# Patient Record
Sex: MALE | Race: WHITE | NOT HISPANIC OR LATINO | Employment: FULL TIME | URBAN - METROPOLITAN AREA
[De-identification: names, ages, dates, MRNs, and addresses within clinical notes are randomized per-mention and may not be internally consistent; named-entity substitution may affect disease eponyms.]

---

## 2020-04-29 ENCOUNTER — COMMUNICATION - HEALTHEAST (OUTPATIENT)
Dept: SCHEDULING | Facility: CLINIC | Age: 50
End: 2020-04-29

## 2021-06-07 NOTE — TELEPHONE ENCOUNTER
RN Triage:    History of diabetes and obesity.  Lives in [place], [place].    Headache, increasing shortness of breath, abdominal pain and fatigue worsening over several days.  Feels more comfortable breathing with CPAP machine.  Primary physician in [place] suggested that he get tested for Covid-19.  Referred to [email or web address].    Ricarda Mireles RN   [facility]    Reason for Disposition    MILD difficulty breathing (e.g., minimal/no SOB at rest, SOB with walking, pulse <100)    HIGH RISK patient (e.g., age > 64 years, diabetes, heart or lung disease, weak immune system)    Protocols used: CORONAVIRUS (COVID-19) DIAGNOSED OR [id number] 3.30.20

## 2022-08-05 LAB — HEP C HIM: NORMAL

## 2023-05-16 LAB
ALBUMIN (URINE) MG/SPEC: 75 MG/L
ALBUMIN/CREATININE RATIO: 71.4 MG/G CREAT
CREATININE (URINE): 1.05 G/L

## 2023-09-14 ENCOUNTER — TRANSFERRED RECORDS (OUTPATIENT)
Dept: MULTI SPECIALTY CLINIC | Facility: CLINIC | Age: 53
End: 2023-09-14

## 2023-09-14 LAB
CHOLESTEROL (EXTERNAL): 161 MG/DL (ref 100–199)
HDLC SERPL-MCNC: 40 MG/DL
LDL CHOLESTEROL CALCULATED (EXTERNAL): 83 MG/DL
NON HDL CHOLESTEROL (EXTERNAL): 121 MG/DL
TRIGLYCERIDES (EXTERNAL): 191 MG/DL

## 2024-02-09 ENCOUNTER — TRANSFERRED RECORDS (OUTPATIENT)
Dept: MULTI SPECIALTY CLINIC | Facility: CLINIC | Age: 54
End: 2024-02-09

## 2024-02-09 LAB — HBA1C MFR BLD: 6.3 %

## 2024-03-13 ENCOUNTER — OFFICE VISIT (OUTPATIENT)
Dept: FAMILY MEDICINE | Facility: CLINIC | Age: 54
End: 2024-03-13
Payer: COMMERCIAL

## 2024-03-13 ENCOUNTER — HOSPITAL ENCOUNTER (OUTPATIENT)
Dept: CT IMAGING | Facility: HOSPITAL | Age: 54
Discharge: HOME | End: 2024-03-13
Attending: FAMILY MEDICINE | Admitting: FAMILY MEDICINE
Payer: COMMERCIAL

## 2024-03-13 VITALS
SYSTOLIC BLOOD PRESSURE: 123 MMHG | RESPIRATION RATE: 20 BRPM | HEART RATE: 75 BPM | OXYGEN SATURATION: 96 % | DIASTOLIC BLOOD PRESSURE: 83 MMHG | TEMPERATURE: 99 F

## 2024-03-13 DIAGNOSIS — R10.84 ABDOMINAL PAIN, GENERALIZED: Primary | ICD-10-CM

## 2024-03-13 DIAGNOSIS — R10.84 ABDOMINAL PAIN, GENERALIZED: ICD-10-CM

## 2024-03-13 LAB
ALBUMIN SERPL BCG-MCNC: 4.4 G/DL (ref 3.5–5.2)
ALBUMIN UR-MCNC: NEGATIVE MG/DL
ALP SERPL-CCNC: 59 U/L (ref 40–150)
ALT SERPL W P-5'-P-CCNC: 20 U/L (ref 0–70)
ANION GAP SERPL CALCULATED.3IONS-SCNC: 13 MMOL/L (ref 7–15)
APPEARANCE UR: CLEAR
AST SERPL W P-5'-P-CCNC: 20 U/L (ref 0–45)
BASOPHILS # BLD AUTO: 0 10E3/UL (ref 0–0.2)
BASOPHILS NFR BLD AUTO: 0 %
BILIRUB SERPL-MCNC: 0.4 MG/DL
BILIRUB UR QL STRIP: NEGATIVE
BUN SERPL-MCNC: 10.5 MG/DL (ref 6–20)
CALCIUM SERPL-MCNC: 9.7 MG/DL (ref 8.6–10)
CHLORIDE SERPL-SCNC: 94 MMOL/L (ref 98–107)
COLOR UR AUTO: YELLOW
CREAT BLD-MCNC: 0.9 MG/DL (ref 0.7–1.3)
CREAT SERPL-MCNC: 0.85 MG/DL (ref 0.67–1.17)
DEPRECATED HCO3 PLAS-SCNC: 24 MMOL/L (ref 22–29)
EGFRCR SERPLBLD CKD-EPI 2021: >60 ML/MIN/1.73M2
EGFRCR SERPLBLD CKD-EPI 2021: >90 ML/MIN/1.73M2
EOSINOPHIL # BLD AUTO: 0.1 10E3/UL (ref 0–0.7)
EOSINOPHIL NFR BLD AUTO: 1 %
ERYTHROCYTE [DISTWIDTH] IN BLOOD BY AUTOMATED COUNT: 12.3 % (ref 10–15)
GLUCOSE SERPL-MCNC: 97 MG/DL (ref 70–99)
GLUCOSE UR STRIP-MCNC: >=1000 MG/DL
HCT VFR BLD AUTO: 46.9 % (ref 40–53)
HGB BLD-MCNC: 16.3 G/DL (ref 13.3–17.7)
HGB UR QL STRIP: NEGATIVE
IMM GRANULOCYTES # BLD: 0 10E3/UL
IMM GRANULOCYTES NFR BLD: 0 %
KETONES UR STRIP-MCNC: 15 MG/DL
LEUKOCYTE ESTERASE UR QL STRIP: NEGATIVE
LYMPHOCYTES # BLD AUTO: 2.4 10E3/UL (ref 0.8–5.3)
LYMPHOCYTES NFR BLD AUTO: 26 %
MCH RBC QN AUTO: 29.3 PG (ref 26.5–33)
MCHC RBC AUTO-ENTMCNC: 34.8 G/DL (ref 31.5–36.5)
MCV RBC AUTO: 84 FL (ref 78–100)
MONOCYTES # BLD AUTO: 0.7 10E3/UL (ref 0–1.3)
MONOCYTES NFR BLD AUTO: 8 %
NEUTROPHILS # BLD AUTO: 6 10E3/UL (ref 1.6–8.3)
NEUTROPHILS NFR BLD AUTO: 65 %
NITRATE UR QL: NEGATIVE
PH UR STRIP: 7 [PH] (ref 5–8)
PLATELET # BLD AUTO: 319 10E3/UL (ref 150–450)
POTASSIUM SERPL-SCNC: 5.3 MMOL/L (ref 3.4–5.3)
PROT SERPL-MCNC: 7.2 G/DL (ref 6.4–8.3)
RBC # BLD AUTO: 5.56 10E6/UL (ref 4.4–5.9)
SODIUM SERPL-SCNC: 131 MMOL/L (ref 135–145)
SP GR UR STRIP: 1.01 (ref 1–1.03)
UROBILINOGEN UR STRIP-ACNC: 0.2 E.U./DL
WBC # BLD AUTO: 9.3 10E3/UL (ref 4–11)

## 2024-03-13 PROCEDURE — 82565 ASSAY OF CREATININE: CPT | Performed by: FAMILY MEDICINE

## 2024-03-13 PROCEDURE — 81003 URINALYSIS AUTO W/O SCOPE: CPT | Performed by: FAMILY MEDICINE

## 2024-03-13 PROCEDURE — 36415 COLL VENOUS BLD VENIPUNCTURE: CPT | Performed by: FAMILY MEDICINE

## 2024-03-13 PROCEDURE — 250N000011 HC RX IP 250 OP 636: Performed by: FAMILY MEDICINE

## 2024-03-13 PROCEDURE — 85025 COMPLETE CBC W/AUTO DIFF WBC: CPT | Performed by: FAMILY MEDICINE

## 2024-03-13 PROCEDURE — 82565 ASSAY OF CREATININE: CPT

## 2024-03-13 PROCEDURE — 74177 CT ABD & PELVIS W/CONTRAST: CPT

## 2024-03-13 PROCEDURE — 99204 OFFICE O/P NEW MOD 45 MIN: CPT | Performed by: FAMILY MEDICINE

## 2024-03-13 RX ORDER — GLIPIZIDE 2.5 MG/1
2.5 TABLET, EXTENDED RELEASE ORAL
COMMUNITY
Start: 2023-09-14

## 2024-03-13 RX ORDER — AMLODIPINE BESYLATE 5 MG/1
1 TABLET ORAL DAILY
COMMUNITY
Start: 2023-05-16

## 2024-03-13 RX ORDER — ASPIRIN 81 MG/1
81 TABLET ORAL
COMMUNITY
Start: 2023-05-16

## 2024-03-13 RX ORDER — LISINOPRIL 20 MG/1
1 TABLET ORAL DAILY
COMMUNITY
Start: 2023-05-16

## 2024-03-13 RX ORDER — EMPAGLIFLOZIN 25 MG/1
TABLET, FILM COATED ORAL
COMMUNITY
Start: 2023-06-30

## 2024-03-13 RX ORDER — EPINEPHRINE 0.3 MG/.3ML
0.3 INJECTION SUBCUTANEOUS
COMMUNITY
Start: 2024-02-09 | End: 2024-06-05

## 2024-03-13 RX ORDER — IOPAMIDOL 755 MG/ML
90 INJECTION, SOLUTION INTRAVASCULAR ONCE
Status: COMPLETED | OUTPATIENT
Start: 2024-03-13 | End: 2024-03-13

## 2024-03-13 RX ORDER — PRAVASTATIN SODIUM 20 MG
1 TABLET ORAL AT BEDTIME
COMMUNITY
Start: 2023-05-16 | End: 2024-08-23 | Stop reason: ALTCHOICE

## 2024-03-13 RX ORDER — DIVALPROEX SODIUM 500 MG/1
2 TABLET, EXTENDED RELEASE ORAL AT BEDTIME
COMMUNITY
Start: 2023-08-22

## 2024-03-13 RX ORDER — METFORMIN HCL 500 MG
1000 TABLET, EXTENDED RELEASE 24 HR ORAL
COMMUNITY
Start: 2023-05-16

## 2024-03-13 RX ORDER — PSYLLIUM HUSK 0.4 G
1000 CAPSULE ORAL
COMMUNITY
Start: 2024-01-29

## 2024-03-13 RX ADMIN — IOPAMIDOL 90 ML: 755 INJECTION, SOLUTION INTRAVENOUS at 12:08

## 2024-03-13 NOTE — PROGRESS NOTES
Assessment:       Abdominal pain, generalized    - CBC with platelets differential  - Comprehensive metabolic panel  - CT Abdomen Pelvis w Contrast  - CBC with platelets differential  - Comprehensive metabolic panel  - UA Macroscopic with reflex to Microscopic and Culture - Clinic Collect         Plan:     Patient with abdominal pain of unclear etiology and prognosis.  CBC unremarkable.  UA unremarkable other than large glucose in his urine.  CT scan of his abdomen and pelvis ordered and no evidence to explain patient's symptoms.  Recommend using MiraLAX daily over the next week to see if this will help reduce regular bowel movements and make a follow-up appointment to establish with primary care as well as follow-up of current abdominal symptoms if they are not improving in 1 week.  Patient is agreeable with this plan.  MEDICATIONS:   Orders Placed This Encounter   Medications    amLODIPine (NORVASC) 5 MG tablet     Sig: Take 1 tablet by mouth daily    aspirin 81 MG EC tablet     Sig: Take 81 mg by mouth    VITAMIN D-1000 MAX ST 25 MCG (1000 UT) tablet     Sig: Take 1,000 Units by mouth    vitamin B-12 (CYANOCOBALAMIN) 500 MCG tablet     Sig: Take 500 mcg by mouth    divalproex sodium extended-release (DEPAKOTE ER) 500 MG 24 hr tablet     Sig: Take 2 tablets by mouth at bedtime    JARDIANCE 25 MG TABS tablet     Sig: TAKE 1 TABLET(25 MG) BY MOUTH EVERY DAY. NOTE DOSE INCREASE    EPINEPHrine (ANY BX GENERIC EQUIV) 0.3 MG/0.3ML injection 2-pack     Sig: Inject 0.3 mg into the muscle    glipiZIDE (GLUCOTROL XL) 2.5 MG 24 hr tablet     Sig: Take 2.5 mg by mouth    lisinopril (ZESTRIL) 20 MG tablet     Sig: Take 1 tablet by mouth daily    metFORMIN (GLUCOPHAGE XR) 500 MG 24 hr tablet     Sig: Take 1,000 mg by mouth    pravastatin (PRAVACHOL) 20 MG tablet     Sig: Take 1 tablet by mouth at bedtime       Subjective:       53 year old male presents for evaluation of a 2-week history of abdominal discomfort.  His pain  initially started in the right upper quadrant but now has since moved more so towards the right lower quadrant and left lower quadrant.  He has been having frequent loose bowel movements but not watery.  The caliber is very small.  He often has been having excruciating abdominal pain sometimes with bowel movements but other times without.  He has been urinating okay.  He has been passing more gas recently.  He has never had a colonoscopy.  His appetite has been somewhat decreased.  He denies any nausea or vomiting.  There has been no blood in his stools or no black or tarry stools.  No belching or heartburn symptoms.  There is been no associated nausea.  He has lost about 20 pounds relatively recently but attributes this to his job being more active.    There is no problem list on file for this patient.      No past medical history on file.    No past surgical history on file.    Current Outpatient Medications   Medication    amLODIPine (NORVASC) 5 MG tablet    aspirin 81 MG EC tablet    divalproex sodium extended-release (DEPAKOTE ER) 500 MG 24 hr tablet    glipiZIDE (GLUCOTROL XL) 2.5 MG 24 hr tablet    JARDIANCE 25 MG TABS tablet    lisinopril (ZESTRIL) 20 MG tablet    metFORMIN (GLUCOPHAGE XR) 500 MG 24 hr tablet    pravastatin (PRAVACHOL) 20 MG tablet    vitamin B-12 (CYANOCOBALAMIN) 500 MCG tablet    VITAMIN D-1000 MAX ST 25 MCG (1000 UT) tablet    EPINEPHrine (ANY BX GENERIC EQUIV) 0.3 MG/0.3ML injection 2-pack     No current facility-administered medications for this visit.       Allergies   Allergen Reactions    Fluoxetine Headache    Semaglutide Other (See Comments)     Belching, abd bloating    Shellfish Allergy      Other Reaction(s): Throat Swelling/Closing       No family history on file.    Social History     Socioeconomic History    Marital status: Single     Spouse name: None    Number of children: None    Years of education: None    Highest education level: None   Tobacco Use    Smoking status:  Every Day     Types: Cigarettes     Passive exposure: Never    Smokeless tobacco: Never   Substance and Sexual Activity    Alcohol use: Not Currently    Drug use: Never         Review of Systems  Pertinent items are noted in HPI.      Objective:                 General Appearance:    /83   Pulse 75   Temp 99  F (37.2  C) (Oral)   Resp 20   SpO2 96%         Alert, pleasant, cooperative, no distress, appears stated age, morbidly obese   Head:    Normocephalic, without obvious abnormality, atraumatic   Eyes:    Conjunctiva/corneas clear   Ears:    Normal TM's without erythema or bulging. Normal external ear canals, both ears   Nose:   Nares normal, septum midline, mucosa normal, no drainage    or sinus tenderness   Throat:   Lips, mucosa, and tongue normal; teeth and gums normal.  No tonsilar hypertrophy or exudate.   Neck:   Supple, symmetrical, trachea midline, no adenopathy    Lungs:     Clear to auscultation bilaterally without wheezes, rales, or rhonchi, respirations unlabored    Heart:    Regular rate and rhythm, S1 and S2 normal, no murmur, rub or gallop                          Abdomen: Soft, tender in the right lower quadrant as well as left lower quadrant.  No rebound or guarding.  No distention.  Abdominal exam limited by body habitus.    Extremities:   Extremities normal, atraumatic, no cyanosis or edema   Skin:   Skin color, texture, turgor normal, no rashes or lesions       This note has been dictated using voice recognition software. Any grammatical or context distortions are unintentional and inherent to the software

## 2024-03-13 NOTE — PATIENT INSTRUCTIONS
Your CT scan was completely normal and so far your blood work is normal as well.    I do think it is important for you to establish care with primary care.  Please follow-up with them if your symptoms are getting worse or not improving.

## 2024-05-12 ENCOUNTER — HEALTH MAINTENANCE LETTER (OUTPATIENT)
Age: 54
End: 2024-05-12

## 2024-06-05 ENCOUNTER — OFFICE VISIT (OUTPATIENT)
Dept: FAMILY MEDICINE | Facility: CLINIC | Age: 54
End: 2024-06-05
Payer: COMMERCIAL

## 2024-06-05 VITALS
WEIGHT: 310.5 LBS | SYSTOLIC BLOOD PRESSURE: 132 MMHG | BODY MASS INDEX: 44.45 KG/M2 | RESPIRATION RATE: 12 BRPM | HEART RATE: 85 BPM | TEMPERATURE: 98.9 F | DIASTOLIC BLOOD PRESSURE: 82 MMHG | OXYGEN SATURATION: 97 % | HEIGHT: 70 IN

## 2024-06-05 DIAGNOSIS — R05.1 ACUTE COUGH: ICD-10-CM

## 2024-06-05 DIAGNOSIS — Z91.013 SHELLFISH ALLERGY: ICD-10-CM

## 2024-06-05 DIAGNOSIS — J01.30 ACUTE NON-RECURRENT SPHENOIDAL SINUSITIS: Primary | ICD-10-CM

## 2024-06-05 DIAGNOSIS — F06.33 BIPOLAR AND RELATED DISORDER DUE TO ANOTHER MEDICAL CONDITION WITH MANIC OR HYPOMANIC-LIKE EPISODES: ICD-10-CM

## 2024-06-05 DIAGNOSIS — Z23 NEED FOR VACCINATION: ICD-10-CM

## 2024-06-05 DIAGNOSIS — F43.10 POSTTRAUMATIC STRESS DISORDER WITH DELAYED EXPRESSION: ICD-10-CM

## 2024-06-05 PROBLEM — G25.1 MEDICATION-INDUCED POSTURAL TREMOR: Status: ACTIVE | Noted: 2022-05-05

## 2024-06-05 PROBLEM — F41.1 GENERALIZED ANXIETY DISORDER: Status: ACTIVE | Noted: 2019-07-30

## 2024-06-05 PROBLEM — G47.33 OSA ON CPAP: Status: ACTIVE | Noted: 2017-04-20

## 2024-06-05 PROBLEM — Z87.891 FORMER CIGARETTE SMOKER: Status: ACTIVE | Noted: 2024-06-05

## 2024-06-05 PROBLEM — R79.89 LOW VITAMIN B12 LEVEL: Status: ACTIVE | Noted: 2019-02-01

## 2024-06-05 PROBLEM — S06.9XAA TRAUMATIC BRAIN INJURY (H): Status: ACTIVE | Noted: 2018-07-11

## 2024-06-05 PROBLEM — D17.21 LIPOMA OF RIGHT SHOULDER: Status: ACTIVE | Noted: 2018-06-01

## 2024-06-05 PROBLEM — F12.90 EPISODIC CANNABIS USE: Status: ACTIVE | Noted: 2020-06-08

## 2024-06-05 PROBLEM — A63.0 CONDYLOMA ACUMINATUM IN MALE: Status: ACTIVE | Noted: 2023-01-01

## 2024-06-05 PROCEDURE — 90472 IMMUNIZATION ADMIN EACH ADD: CPT | Performed by: FAMILY MEDICINE

## 2024-06-05 PROCEDURE — 90677 PCV20 VACCINE IM: CPT | Performed by: FAMILY MEDICINE

## 2024-06-05 PROCEDURE — 96127 BRIEF EMOTIONAL/BEHAV ASSMT: CPT | Performed by: FAMILY MEDICINE

## 2024-06-05 PROCEDURE — 99214 OFFICE O/P EST MOD 30 MIN: CPT | Mod: 25 | Performed by: FAMILY MEDICINE

## 2024-06-05 PROCEDURE — 90471 IMMUNIZATION ADMIN: CPT | Performed by: FAMILY MEDICINE

## 2024-06-05 PROCEDURE — 90715 TDAP VACCINE 7 YRS/> IM: CPT | Performed by: FAMILY MEDICINE

## 2024-06-05 RX ORDER — EPINEPHRINE 0.3 MG/.3ML
0.3 INJECTION SUBCUTANEOUS PRN
Qty: 2 EACH | Refills: 2 | Status: SHIPPED | OUTPATIENT
Start: 2024-06-05

## 2024-06-05 RX ORDER — ZIPRASIDONE HYDROCHLORIDE 40 MG/1
40 CAPSULE ORAL EVERY OTHER DAY
COMMUNITY

## 2024-06-05 RX ORDER — BENZONATATE 100 MG/1
100 CAPSULE ORAL 3 TIMES DAILY PRN
Qty: 30 CAPSULE | Refills: 0 | Status: SHIPPED | OUTPATIENT
Start: 2024-06-05 | End: 2024-07-03

## 2024-06-05 ASSESSMENT — ANXIETY QUESTIONNAIRES
5. BEING SO RESTLESS THAT IT IS HARD TO SIT STILL: NEARLY EVERY DAY
GAD7 TOTAL SCORE: 15
4. TROUBLE RELAXING: MORE THAN HALF THE DAYS
IF YOU CHECKED OFF ANY PROBLEMS ON THIS QUESTIONNAIRE, HOW DIFFICULT HAVE THESE PROBLEMS MADE IT FOR YOU TO DO YOUR WORK, TAKE CARE OF THINGS AT HOME, OR GET ALONG WITH OTHER PEOPLE: VERY DIFFICULT
7. FEELING AFRAID AS IF SOMETHING AWFUL MIGHT HAPPEN: MORE THAN HALF THE DAYS
1. FEELING NERVOUS, ANXIOUS, OR ON EDGE: MORE THAN HALF THE DAYS
6. BECOMING EASILY ANNOYED OR IRRITABLE: NOT AT ALL
GAD7 TOTAL SCORE: 15
3. WORRYING TOO MUCH ABOUT DIFFERENT THINGS: NEARLY EVERY DAY
2. NOT BEING ABLE TO STOP OR CONTROL WORRYING: NEARLY EVERY DAY

## 2024-06-05 ASSESSMENT — ENCOUNTER SYMPTOMS
SWEATS: 1
SORE THROAT: 1
COUGH: 1

## 2024-06-05 ASSESSMENT — LIFESTYLE VARIABLES: SMOKING_STATUS: 1

## 2024-06-05 ASSESSMENT — PATIENT HEALTH QUESTIONNAIRE - PHQ9
10. IF YOU CHECKED OFF ANY PROBLEMS, HOW DIFFICULT HAVE THESE PROBLEMS MADE IT FOR YOU TO DO YOUR WORK, TAKE CARE OF THINGS AT HOME, OR GET ALONG WITH OTHER PEOPLE: VERY DIFFICULT
SUM OF ALL RESPONSES TO PHQ QUESTIONS 1-9: 14
SUM OF ALL RESPONSES TO PHQ QUESTIONS 1-9: 14

## 2024-06-05 NOTE — PROGRESS NOTES
"  Assessment & Plan   Problem List Items Addressed This Visit       Bipolar and related disorder due to another medical condition with manic or hypomanic-like episodes     As per his psychiatrist recommendations recommend moving the Geodon to nightly to not only help with sleep but help with some of the manic episode portions that he is currently expressing.         Relevant Orders    Adult Mental Health  Referral    Posttraumatic stress disorder with delayed expression    Relevant Orders    Adult Mental Health  Referral    Shellfish allergy     reFill of epinephrine pen and how and when to use properly         Relevant Medications    EPINEPHrine (ANY BX GENERIC EQUIV) 0.3 MG/0.3ML injection 2-pack     Other Visit Diagnoses       Acute non-recurrent sphenoidal sinusitis    -  Primary    Treatment as per orders.  Follow-up in 6 to 8 weeks for annual physical and to establish care    Relevant Medications    amoxicillin-clavulanate (AUGMENTIN) 875-125 MG tablet    benzonatate (TESSALON) 100 MG capsule    Need for vaccination        Relevant Orders    Pneumococcal 20 Valent Conjugate (Prevnar 20) (Completed)    TDAP 10-64Y (ADACEL,BOOSTRIX) (Completed)    Acute cough        Symptomatic care discussed as well as use of Tessalon Perles.    Relevant Medications    benzonatate (TESSALON) 100 MG capsule              Nicotine/Tobacco Cessation  He reports that he has been smoking cigarettes. He started smoking about 31 years ago. He has a 15.7 pack-year smoking history. He has been exposed to tobacco smoke. He has never used smokeless tobacco.  Nicotine/Tobacco Cessation Plan  Information offered: Patient not interested at this time      BMI  Estimated body mass index is 44.55 kg/m  as calculated from the following:    Height as of this encounter: 1.778 m (5' 10\").    Weight as of this encounter: 140.8 kg (310 lb 8 oz).   Weight management plan: Discussed healthy diet and exercise guidelines    Depression " Screening Follow Up        6/5/2024     8:52 AM   PHQ   PHQ-9 Total Score 14   Q9: Thoughts of better off dead/self-harm past 2 weeks Not at all       Follow Up Actions Taken  Crisis resource information provided in After Visit Summary  Mental Health Referral placed  Adjusted patient's anti-depressant medication.     Regular exercise  See Patient Instructions      Arnol Fisher is a 53 year old, presenting for the following health issues:  Cough (x2 Days) and Depression (with Anxiety (Chronic))        6/5/2024    12:06 PM   Additional Questions   Roomed by NICHOLAS Le   Accompanied by Self         6/5/2024    12:06 PM   Patient Reported Additional Medications   Patient reports taking the following new medications N/A     Patient presents to clinic with 5 days of sinus pressure, losing voice, and mild cough.  Additionally he recently lost his niece his same age as his daughter and is undergoing a divorce and with his history of TBI and memory lapses with those stresses some of the past memories are coming forward.  Does have a psychiatrist.  Does not know currently have a counselor and is willing to get set up with a counselor.  He only takes his Geodon every other day and with the current rev he has not been increased to daily.  Denies any homicidal or suicidal ideation or thoughts of harming self or others.  Just has an overwhelming sense of doom.    History of Present Illness       Reason for visit:  Heavy chest cough and mental distress    He eats 0-1 servings of fruits and vegetables daily.He consumes 1 sweetened beverage(s) daily.He exercises with enough effort to increase his heart rate 9 or less minutes per day.  He exercises with enough effort to increase his heart rate 3 or less days per week.   He is taking medications regularly.         6/5/2024     8:52 AM   PHQ   PHQ-9 Total Score 14   Q9: Thoughts of better off dead/self-harm past 2 weeks Not at all          6/5/2024    12:00 PM   JOANN-7 SCORE   Total  "Score 15           Objective    /82   Pulse 85   Temp 98.9  F (37.2  C) (Oral)   Resp 12   Ht 1.778 m (5' 10\")   Wt 140.8 kg (310 lb 8 oz)   SpO2 97%   BMI 44.55 kg/m    Body mass index is 44.55 kg/m .  Physical Exam  Vitals and nursing note reviewed.   Constitutional:       General: He is not in acute distress.     Appearance: Normal appearance. He is not ill-appearing.   HENT:      Head: Normocephalic and atraumatic.      Comments: Tender to palpation along the ethmoid sinus region     Right Ear: Tympanic membrane, ear canal and external ear normal.      Left Ear: Tympanic membrane, ear canal and external ear normal.      Nose: Congestion present. No rhinorrhea.      Mouth/Throat:      Mouth: Mucous membranes are moist.      Pharynx: No oropharyngeal exudate or posterior oropharyngeal erythema.   Eyes:      Extraocular Movements: Extraocular movements intact.      Conjunctiva/sclera: Conjunctivae normal.   Cardiovascular:      Rate and Rhythm: Normal rate and regular rhythm.      Pulses: Normal pulses.      Heart sounds: Normal heart sounds.   Pulmonary:      Effort: Pulmonary effort is normal.      Breath sounds: Normal breath sounds. No wheezing, rhonchi or rales.   Musculoskeletal:      Cervical back: Normal range of motion.      Right lower leg: No edema.      Left lower leg: No edema.   Lymphadenopathy:      Cervical: No cervical adenopathy.   Neurological:      Mental Status: He is alert and oriented to person, place, and time.   Psychiatric:         Attention and Perception: Attention normal.         Mood and Affect: Mood normal.         Speech: Speech normal.         Thought Content: Thought content normal.      The longitudinal plan of care for the diagnosis(es)/condition(s) as documented were addressed during this visit. Due to the added complexity in care, I will continue to support Phillip in the subsequent management and with ongoing continuity of care.      Signed Electronically by: " Bernardino Aguilera, DO

## 2024-06-05 NOTE — ASSESSMENT & PLAN NOTE
As per his psychiatrist recommendations recommend moving the Geodon to nightly to not only help with sleep but help with some of the manic episode portions that he is currently expressing.

## 2024-06-05 NOTE — LETTER
June 5, 2024      Phillip Rodriguez  [address] [place] [address]        To Whom It May Concern:    Phillip Rodriguez  was seen on 06/05/24.  Please excuse him until 6/7/2024 due to illness.        Sincerely,        Bernardino Aguilera, DO

## 2024-06-12 ENCOUNTER — MYC MEDICAL ADVICE (OUTPATIENT)
Dept: FAMILY MEDICINE | Facility: CLINIC | Age: 54
End: 2024-06-12
Payer: COMMERCIAL

## 2024-06-17 ENCOUNTER — MYC MEDICAL ADVICE (OUTPATIENT)
Dept: FAMILY MEDICINE | Facility: CLINIC | Age: 54
End: 2024-06-17
Payer: COMMERCIAL

## 2024-06-17 DIAGNOSIS — R05.1 ACUTE COUGH: Primary | ICD-10-CM

## 2024-06-17 RX ORDER — AZITHROMYCIN 250 MG/1
TABLET, FILM COATED ORAL
Qty: 6 TABLET | Refills: 0 | Status: SHIPPED | OUTPATIENT
Start: 2024-06-17 | End: 2024-06-22

## 2024-07-02 SDOH — HEALTH STABILITY: PHYSICAL HEALTH: ON AVERAGE, HOW MANY MINUTES DO YOU ENGAGE IN EXERCISE AT THIS LEVEL?: 30 MIN

## 2024-07-02 SDOH — HEALTH STABILITY: PHYSICAL HEALTH: ON AVERAGE, HOW MANY DAYS PER WEEK DO YOU ENGAGE IN MODERATE TO STRENUOUS EXERCISE (LIKE A BRISK WALK)?: 7 DAYS

## 2024-07-02 ASSESSMENT — SOCIAL DETERMINANTS OF HEALTH (SDOH): HOW OFTEN DO YOU GET TOGETHER WITH FRIENDS OR RELATIVES?: ONCE A WEEK

## 2024-07-03 ENCOUNTER — MYC MEDICAL ADVICE (OUTPATIENT)
Dept: FAMILY MEDICINE | Facility: CLINIC | Age: 54
End: 2024-07-03

## 2024-07-03 ENCOUNTER — OFFICE VISIT (OUTPATIENT)
Dept: FAMILY MEDICINE | Facility: CLINIC | Age: 54
End: 2024-07-03
Payer: COMMERCIAL

## 2024-07-03 VITALS
TEMPERATURE: 99.3 F | WEIGHT: 312.8 LBS | DIASTOLIC BLOOD PRESSURE: 74 MMHG | HEIGHT: 70 IN | SYSTOLIC BLOOD PRESSURE: 123 MMHG | RESPIRATION RATE: 16 BRPM | HEART RATE: 93 BPM | OXYGEN SATURATION: 96 % | BODY MASS INDEX: 44.78 KG/M2

## 2024-07-03 DIAGNOSIS — G47.33 OSA ON CPAP: Primary | ICD-10-CM

## 2024-07-03 DIAGNOSIS — E78.5 HYPERLIPIDEMIA LDL GOAL <70: ICD-10-CM

## 2024-07-03 DIAGNOSIS — Z00.00 ROUTINE GENERAL MEDICAL EXAMINATION AT A HEALTH CARE FACILITY: Primary | ICD-10-CM

## 2024-07-03 DIAGNOSIS — I10 PRIMARY HYPERTENSION: ICD-10-CM

## 2024-07-03 DIAGNOSIS — Z91.013 SHELLFISH ALLERGY: ICD-10-CM

## 2024-07-03 DIAGNOSIS — Z87.891 PERSONAL HISTORY OF TOBACCO USE: ICD-10-CM

## 2024-07-03 DIAGNOSIS — F06.33 BIPOLAR AND RELATED DISORDER DUE TO ANOTHER MEDICAL CONDITION WITH MANIC OR HYPOMANIC-LIKE EPISODES: ICD-10-CM

## 2024-07-03 DIAGNOSIS — F43.10 POSTTRAUMATIC STRESS DISORDER WITH DELAYED EXPRESSION: ICD-10-CM

## 2024-07-03 DIAGNOSIS — E11.9 TYPE 2 DIABETES MELLITUS WITHOUT COMPLICATION, WITHOUT LONG-TERM CURRENT USE OF INSULIN (H): ICD-10-CM

## 2024-07-03 DIAGNOSIS — Z12.5 PROSTATE CANCER SCREENING: ICD-10-CM

## 2024-07-03 DIAGNOSIS — R79.89 LOW VITAMIN B12 LEVEL: ICD-10-CM

## 2024-07-03 DIAGNOSIS — Z11.4 SCREENING FOR HIV (HUMAN IMMUNODEFICIENCY VIRUS): ICD-10-CM

## 2024-07-03 DIAGNOSIS — G47.33 OSA ON CPAP: ICD-10-CM

## 2024-07-03 LAB
ERYTHROCYTE [DISTWIDTH] IN BLOOD BY AUTOMATED COUNT: 13 % (ref 10–15)
HBA1C MFR BLD: 6.2 % (ref 0–5.6)
HCT VFR BLD AUTO: 47.6 % (ref 40–53)
HGB BLD-MCNC: 16.3 G/DL (ref 13.3–17.7)
HOLD SPECIMEN: NORMAL
MCH RBC QN AUTO: 29 PG (ref 26.5–33)
MCHC RBC AUTO-ENTMCNC: 34.2 G/DL (ref 31.5–36.5)
MCV RBC AUTO: 85 FL (ref 78–100)
PLATELET # BLD AUTO: 328 10E3/UL (ref 150–450)
RBC # BLD AUTO: 5.62 10E6/UL (ref 4.4–5.9)
WBC # BLD AUTO: 12.3 10E3/UL (ref 4–11)

## 2024-07-03 PROCEDURE — 87389 HIV-1 AG W/HIV-1&-2 AB AG IA: CPT | Performed by: FAMILY MEDICINE

## 2024-07-03 PROCEDURE — 80048 BASIC METABOLIC PNL TOTAL CA: CPT | Performed by: FAMILY MEDICINE

## 2024-07-03 PROCEDURE — G0296 VISIT TO DETERM LDCT ELIG: HCPCS | Performed by: FAMILY MEDICINE

## 2024-07-03 PROCEDURE — 84460 ALANINE AMINO (ALT) (SGPT): CPT | Performed by: FAMILY MEDICINE

## 2024-07-03 PROCEDURE — 83036 HEMOGLOBIN GLYCOSYLATED A1C: CPT | Performed by: FAMILY MEDICINE

## 2024-07-03 PROCEDURE — 80061 LIPID PANEL: CPT | Performed by: FAMILY MEDICINE

## 2024-07-03 PROCEDURE — 82570 ASSAY OF URINE CREATININE: CPT | Performed by: FAMILY MEDICINE

## 2024-07-03 PROCEDURE — G0103 PSA SCREENING: HCPCS | Performed by: FAMILY MEDICINE

## 2024-07-03 PROCEDURE — 36415 COLL VENOUS BLD VENIPUNCTURE: CPT | Performed by: FAMILY MEDICINE

## 2024-07-03 PROCEDURE — 82043 UR ALBUMIN QUANTITATIVE: CPT | Performed by: FAMILY MEDICINE

## 2024-07-03 PROCEDURE — 99214 OFFICE O/P EST MOD 30 MIN: CPT | Mod: 25 | Performed by: FAMILY MEDICINE

## 2024-07-03 PROCEDURE — 85027 COMPLETE CBC AUTOMATED: CPT | Performed by: FAMILY MEDICINE

## 2024-07-03 PROCEDURE — 99396 PREV VISIT EST AGE 40-64: CPT | Mod: 25 | Performed by: FAMILY MEDICINE

## 2024-07-03 RX ORDER — METHYLDOPA/HYDROCHLOROTHIAZIDE 250MG-15MG
1 TABLET ORAL EVERY OTHER DAY
COMMUNITY

## 2024-07-03 NOTE — PATIENT INSTRUCTIONS
Patient Education   Preventive Care Advice   This is general advice given by our system to help you stay healthy. However, your care team may have specific advice just for you. Please talk to your care team about your preventive care needs.  Nutrition  Eat 5 or more servings of fruits and vegetables each day.  Try wheat bread, brown rice and whole grain pasta (instead of white bread, rice, and pasta).  Get enough calcium and vitamin D. Check the label on foods and aim for 100% of the RDA (recommended daily allowance).  Lifestyle  Exercise at least 150 minutes each week  (30 minutes a day, 5 days a week).  Do muscle strengthening activities 2 days a week. These help control your weight and prevent disease.  No smoking.  Wear sunscreen to prevent skin cancer.  Have a dental exam and cleaning every 6 months.  Yearly exams  See your health care team every year to talk about:  Any changes in your health.  Any medicines your care team has prescribed.  Preventive care, family planning, and ways to prevent chronic diseases.  Shots (vaccines)   HPV shots (up to age 26), if you've never had them before.  Hepatitis B shots (up to age 59), if you've never had them before.  COVID-19 shot: Get this shot when it's due.  Flu shot: Get a flu shot every year.  Tetanus shot: Get a tetanus shot every 10 years.  Pneumococcal, hepatitis A, and RSV shots: Ask your care team if you need these based on your risk.  Shingles shot (for age 50 and up)  General health tests  Diabetes screening:  Starting at age 35, Get screened for diabetes at least every 3 years.  If you are younger than age 35, ask your care team if you should be screened for diabetes.  Cholesterol test: At age 39, start having a cholesterol test every 5 years, or more often if advised.  Bone density scan (DEXA): At age 50, ask your care team if you should have this scan for osteoporosis (brittle bones).  Hepatitis C: Get tested at least once in your life.  STIs (sexually  transmitted infections)  Before age 24: Ask your care team if you should be screened for STIs.  After age 24: Get screened for STIs if you're at risk. You are at risk for STIs (including HIV) if:  You are sexually active with more than one person.  You don't use condoms every time.  You or a partner was diagnosed with a sexually transmitted infection.  If you are at risk for HIV, ask about PrEP medicine to prevent HIV.  Get tested for HIV at least once in your life, whether you are at risk for HIV or not.  Cancer screening tests  Cervical cancer screening: If you have a cervix, begin getting regular cervical cancer screening tests starting at age 21.  Breast cancer scan (mammogram): If you've ever had breasts, begin having regular mammograms starting at age 40. This is a scan to check for breast cancer.  Colon cancer screening: It is important to start screening for colon cancer at age 45.  Have a colonoscopy test every 10 years (or more often if you're at risk) Or, ask your provider about stool tests like a FIT test every year or Cologuard test every 3 years.  To learn more about your testing options, visit:   .  For help making a decision, visit:   https://bit.ly/jv43615.  Prostate cancer screening test: If you have a prostate, ask your care team if a prostate cancer screening test (PSA) at age 55 is right for you.  Lung cancer screening: If you are a current or former smoker ages 50 to 80, ask your care team if ongoing lung cancer screenings are right for you.  For informational purposes only. Not to replace the advice of your health care provider. Copyright   2023 [facility]. All rights reserved. Clinically reviewed by the [facility]. [employer] 731784 - REV 01/24.  Learning About Stress  What is stress?     Stress is your body's response to a hard situation. Your body can have a physical, emotional, or mental response. Stress is a fact of life for most people, and it  affects everyone differently. What causes stress for you may not be stressful for someone else.  A lot of things can cause stress. You may feel stress when you go on a job interview, take a test, or run a race. This kind of short-term stress is normal and even useful. It can help you if you need to work hard or react quickly. For example, stress can help you finish an important job on time.  Long-term stress is caused by ongoing stressful situations or events. Examples of long-term stress include long-term health problems, ongoing problems at work, or conflicts in your family. Long-term stress can harm your health.  How does stress affect your health?  When you are stressed, your body responds as though you are in danger. It makes hormones that speed up your heart, make you breathe faster, and give you a burst of energy. This is called the fight-or-flight stress response. If the stress is over quickly, your body goes back to normal and no harm is done.  But if stress happens too often or lasts too long, it can have bad effects. Long-term stress can make you more likely to get sick, and it can make symptoms of some diseases worse. If you tense up when you are stressed, you may develop neck, shoulder, or low back pain. Stress is linked to high blood pressure and heart disease.  Stress also harms your emotional health. It can make you [place], tense, or depressed. Your relationships may suffer, and you may not do well at work or school.  What can you do to manage stress?  You can try these things to help manage stress:   Do something active. Exercise or activity can help reduce stress. Walking is a great way to get started. Even everyday activities such as housecleaning or yard work can help.  Try yoga or kailey chi. These techniques combine exercise and meditation. You may need some training at first to learn them.  Do something you enjoy. For example, listen to music or go to a movie. Practice your hobby or do volunteer  "work.  Meditate. This can help you relax, because you are not worrying about what happened before or what may happen in the future.  Do guided imagery. Imagine yourself in any setting that helps you feel calm. You can use online videos, books, or a teacher to guide you.  Do breathing exercises. For example:  From a standing position, bend forward from the waist with your knees slightly bent. Let your arms dangle close to the floor.  Breathe in slowly and deeply as you return to a standing position. Roll up slowly and lift your head last.  Hold your breath for just a few seconds in the standing position.  Breathe out slowly and bend forward from the waist.  Let your feelings out. Talk, laugh, cry, and express anger when you need to. Talking with supportive friends or family, a counselor, or a adrian leader about your feelings is a healthy way to relieve stress. Avoid discussing your feelings with people who make you feel worse.  Write. It may help to write about things that are bothering you. This helps you find out how much stress you feel and what is causing it. When you know this, you can find better ways to cope.  What can you do to prevent stress?  You might try some of these things to help prevent stress:  Manage your time. This helps you find time to do the things you want and need to do.  Get enough sleep. Your body recovers from the stresses of the day while you are sleeping.  Get support. Your family, friends, and community can make a difference in how you experience stress.  Limit your news feed. Avoid or limit time on social media or news that may make you feel stressed.  Do something active. Exercise or activity can help reduce stress. Walking is a great way to get started.  Where can you learn more?  Go to https://www.healthwise.net/patiented  Enter N032 in the search box to learn more about \"Learning About Stress.\"  Current as of: October 24, 2023               Content Version: 14.0    5086-5253 " Healthwise, [employer].   Care instructions adapted under license by your healthcare professional. If you have questions about a medical condition or this instruction, always ask your healthcare professional. [employer] disclaims any warranty or liability for your use of this information.      Safer Sex: Care Instructions  Overview  Safer sex is a way to reduce your risk of getting a sexually transmitted infection (STI). It can also help prevent pregnancy.  Several products can help you practice safer sex and reduce your chance of STIs. One of the best is a condom. There are internal and external condoms. You can use a special rubber sheet (dental dam) for protection during oral sex. Disposable gloves can keep your hands from touching blood, semen, or other body fluids that can carry infections.  Remember that birth control methods such as diaphragms, IUDs, foams, and birth control pills do not stop you from getting STIs.  Follow-up care is a key part of your treatment and safety. Be sure to make and go to all appointments, and call your doctor if you are having problems. It's also a good idea to know your test results and keep a list of the medicines you take.  How can you care for yourself at home?  Think about getting vaccinated to help prevent hepatitis A, hepatitis B, and human papillomavirus (HPV). They can be spread through sex.  Use a condom every time you have sex. Use an external condom, which goes on the penis. Or use an internal condom, which goes into the vagina or anus.  Make sure you use the right size external condom. A condom that's too small can break easily. A condom that's too big can slip off during sex.  Use a new condom each time you have sex. Be careful not to poke a hole in the condom when you open the wrapper.  Don't use an internal condom and an external condom at the same time.  Never use petroleum jelly (such as Vaseline), grease, hand lotion, baby oil, or anything with  "oil in it. These products can make holes in the condom.  After intercourse, hold the edge of the condom as you remove it. This will help keep semen from spilling out of the condom.  Do not have sex with anyone who has symptoms of an STI, such as sores on the genitals or mouth.  Do not drink a lot of alcohol or use drugs before sex.  Limit your sex partners. Sex with one partner who has sex only with you can reduce your risk of getting an STI.  Don't share sex toys. But if you do share them, use a condom and clean the sex toys between each use.  Talk to any partners before you have sex. Talk about what you feel comfortable with and whether you have any boundaries with sex. And find out if your partner or partners may be at risk for any STI. Keep in mind that a person may be able to spread an STI even if they do not have symptoms. You and any partners may want to get tested for STIs.  Where can you learn more?  Go to https://www.[employer].net/patiented  Enter B608 in the search box to learn more about \"Safer Sex: Care Instructions.\"  Current as of: November 27, 2023               Content Version: 14.0    8030-1380 [employer].   Care instructions adapted under license by your healthcare professional. If you have questions about a medical condition or this instruction, always ask your healthcare professional. [employer] disclaims any warranty or liability for your use of this information.         Lung Cancer Screening   Frequently Asked Questions  If you are at high-risk for lung cancer, getting screened with low-dose computed tomography (LDCT) every year can help save your life. This handout offers answers to some of the most common questions about lung cancer screening. If you have other questions, please call [phone] ([phone]).     What is it?  Lung cancer screening uses special X-ray technology to create an image of your lung tissue. The exam is quick and easy and takes " less than 10 seconds. We don t give you any medicine or use any needles. You can eat before and after the exam. You don t need to change your clothes as long as the clothing on your chest doesn t contain metal. But, you do need to be able to hold your breath for at least 6 seconds during the exam.    What is the goal of lung cancer screening?  The goal of lung cancer screening is to save lives. Many times, lung cancer is not found until a person starts having physical symptoms. Lung cancer screening can help detect lung cancer in the earliest stages when it may be easier to treat.    Who should be screened for lung cancer?  We suggest lung cancer screening for anyone who is at high-risk for lung cancer. You are in the high-risk group if you:      are between the ages of 55 and 79, and    have smoked at least 1 pack of cigarettes a day for 20 or more years, and    still smoke or have quit within the past 15 years.    However, if you have a new cough or shortness of breath, you should talk to your doctor before being screened.    Why does it matter if I have symptoms?  Certain symptoms can be a sign that you have a condition in your lungs that should be checked and treated by your doctor. These symptoms include fever, chest pain, a new or changing cough, shortness of breath that you have never felt before, coughing up blood or unexplained weight loss. Having any of these symptoms can greatly affect the results of lung cancer screening.       Should all smokers get an LDCT lung cancer screening exam?  It depends. Lung cancer screening is for a very specific group of men and women who have a history of heavy smoking over a long period of time (see  Who should be screened for lung cancer  above).  I am in the high-risk group, but have been diagnosed with cancer in the past. Is LDCT lung cancer screening right for me?  In some cases, you should not have LDCT lung screening, such as when your doctor is already following  your cancer with CT scan studies. Your doctor will help you decide if LDCT lung screening is right for you.  Do I need to have a screening exam every year?  Yes. If you are in the high-risk group described earlier, you should get an LDCT lung cancer screening exam every year until you are 79, or are no longer willing or able to undergo screening and possible procedures to diagnose and treat lung cancer.  How effective is LDCT at preventing death from lung cancer?  Studies have shown that LDCT lung cancer screening can lower the risk of death from lung cancer by 20 percent in people who are at high-risk.  What are the risks?  There are some risks and limitations of LDCT lung cancer screening. We want to make sure you understand the risks and benefits, so please let us know if you have any questions. Your doctor may want to talk with you more about these risks.    Radiation exposure: As with any exam that uses radiation, there is a very small increased risk of cancer. The amount of radiation in LDCT is small--about the same amount a person would get from a mammogram. Your doctor orders the exam when he or she feels the potential benefits outweigh the risks.    False negatives: No test is perfect, including LDCT. It is possible that you may have a medical condition, including lung cancer, that is not found during your exam. This is called a false negative result.    False positives and more testing: LDCT very often finds something in the lung that could be cancer, but in fact is not. This is called a false positive result. False positive tests often cause anxiety. To make sure these findings are not cancer, you may need to have more tests. These tests will be done only if you give us permission. Sometimes patients need a treatment that can have side effects, such as a biopsy. For more information on false positives, see  What can I expect from the results?     Findings not related to lung cancer: Your LDCT exam also  takes pictures of areas of your body next to your lungs. In a very small number of cases, the CT scan will show an abnormal finding in one of these areas, such as your kidneys, adrenal glands, liver or thyroid. This finding may not be serious, but you may need more tests. Your doctor can help you decide what other tests you may need, if any.  What can I expect from the results?  About 1 out of 4 LDCT exams will find something that may need more tests. Most of the time, these findings are lung nodules. Lung nodules are very small collections of tissue in the lung. These nodules are very common, and the vast majority--more than 97 percent--are not cancer (benign). Most are normal lymph nodes or small areas of scarring from past infections.  But, if a small lung nodule is found to be cancer, the cancer can be cured more than 90 percent of the time. To know if the nodule is cancer, we may need to get more images before your next yearly screening exam. If the nodule has suspicious features (for example, it is large, has an odd shape or grows over time), we will refer you to a specialist for further testing.  Will my doctor also get the results?  Yes. Your doctor will get a copy of your results.  Is it okay to keep smoking now that there s a cancer screening exam?  No. Tobacco is one of the strongest cancer-causing agents. It causes not only lung cancer, but other cancers and cardiovascular (heart) diseases as well. The damage caused by smoking builds over time. This means that the longer you smoke, the higher your risk of disease. While it is never too late to quit, the sooner you quit, the better.  Where can I find help to quit smoking?  The best way to prevent lung cancer is to stop smoking. If you have already quit smoking, congratulations and keep it up! For help on quitting smoking, please call [employer] at 3-625-QUIT-NOW (1-940.527.7941) or the [employer] at 1-902.612.3610 to find local resources  near you.  One-on-one health coaching:  If you d prefer to work individually with a health care provider on tobacco cessation, we offer:      Medication Therapy Management:  Our specially trained pharmacists work closely with you and your doctor to help you quit smoking.  Call [phone] or [phone] (toll free).

## 2024-07-03 NOTE — PROGRESS NOTES
Preventive Care Visit  [facility]  Jul 3, 2024      Assessment & Plan   Problem List Items Addressed This Visit       Bipolar and related disorder due to another medical condition with manic or hypomanic-like episodes     Doing much better at this time.  Continue current treatment plan         Diabetes mellitus, type II (H)     Doing very well on current regimen and A1c at 6.2 which below goal of 7.0.  Continue current treatment plan.         Relevant Orders    Adult Eye  Referral    HEMOGLOBIN A1C (Completed)    Albumin Random Urine Quantitative with Creat Ratio    Lipid panel reflex to direct LDL Fasting    Basic metabolic panel    ALT    HTN (hypertension)     Below goal of 140/90 on current regimen which does include an ACE inhibitor as well as Jardiance.  Continue current plan.         Low vitamin B12 level     Recheck CBC and order B12 as needed based on results         Relevant Orders    CBC with platelets (Completed)    DARRIN on CPAP     Has AutoPap at home with correct settings from the sleep study that was previously completed and in his medical records.  Does need new supplies and those were ordered today.         Relevant Orders    CPAP/Comprehensive Sleep Order (Completed)    Posttraumatic stress disorder with delayed expression     Doing well with current medication regimen and counseling.  Encouraged to continue.         Shellfish allergy     Has EpiPen and has been trained on proper use.         Personal history of tobacco use     Still current smoker with greater than 20 pack years and over the age of 49.  Has not yet had baseline screening.  Will start screening.  Cessation encouraged.         Relevant Orders    Prof fee: Shared Decision Making for Lung Cancer Screening (Completed)    CT Chest Lung Cancer Scrn Low Dose wo    Hyperlipidemia LDL goal <70     Has been on pravastatin 20 and LDL has been holding around 80.  With  "recent finding on CT scan of the abdomen showing atherosclerotic disease in the aorta, will change goal to less than 70.  Will recheck lipids today and if not at goal then we will look at changing over to either rosuvastatin or atorvastatin at 10 or 40 mg respectively          Other Visit Diagnoses       Routine general medical examination at a health care facility    -  Primary    Screening for HIV (human immunodeficiency virus)        As per USPSTF guidelines    Relevant Orders    HIV Antigen Antibody Combo    Prostate cancer screening        Relevant Orders    Prostate Specific Antigen Screen             Patient has been advised of split billing requirements and indicates understanding: Yes       Counseling  Appropriate preventive services were discussed with this patient, including applicable screening as appropriate for fall prevention, nutrition, physical activity, Tobacco-use cessation, weight loss and cognition.  Checklist reviewing preventive services available has been given to the patient.  Reviewed patient's diet, addressing concerns and/or questions.   The patient was instructed to see the dentist every 6 months.     Regular exercise  See Patient Instructions      Arnol Fisher is a 53 year old, presenting for the following:  Physical (Establish Care:  Left Foot Pain, Bottom x \"Years\")        7/3/2024     2:57 PM   Additional Questions   Roomed by NICHOLAS Le   Accompanied by Self         7/3/2024     2:57 PM   Patient Reported Additional Medications   Patient reports taking the following new medications N/A        Health Care Directive  Patient does not have a Health Care Directive or Living Will: Discussed advance care planning with patient; however, patient declined at this time.    Denies any chest pain, shortness of breath, dyspnea exertion, palpitations, nausea or vomiting.  Denies any changes in vision or hearing, or urinary or bowel habits.              7/2/2024   General Health   How would you " rate your overall physical health? Good   Feel stress (tense, anxious, or unable to sleep) Rather much      (!) STRESS CONCERN      7/2/2024   Nutrition   Three or more servings of calcium each day? (!) NO   Diet: Diabetic   How many servings of fruit and vegetables per day? (!) 2-3   How many sweetened beverages each day? 0-1            7/2/2024   Exercise   Days per week of moderate/strenous exercise 7 days   Average minutes spent exercising at this level 30 min            7/2/2024   Social Factors   Frequency of gathering with friends or relatives Once a week   Worry food won't last until get money to buy more Yes   Food not last or not have enough money for food? Yes   Do you have housing? (Housing is defined as stable permanent housing and does not include staying ouside in a car, in a tent, in an abandoned building, in an overnight shelter, or couch-surfing.) Yes   Are you worried about losing your housing? Yes   Lack of transportation? No   Unable to get utilities (heat,electricity)? No   Want help with housing or utility concern? No      (!) FOOD SECURITY CONCERN PRESENT(!) HOUSING CONCERN PRESENT      7/2/2024   Fall Risk   Fallen 2 or more times in the past year? No   Trouble with walking or balance? No             7/2/2024   Dental   Dentist two times every year? (!) NO            7/2/2024   TB Screening   Were you born outside of the [place]? No                  7/2/2024   Substance Use   Alcohol more than 3/day or more than 7/wk No   Do you use any other substances recreationally? (!) CANNABIS PRODUCTS        Social History     Tobacco Use    Smoking status: Every Day     Current packs/day: 0.75     Average packs/day: 0.7 packs/day for 31.5 years (23.6 ttl pk-yrs)     Types: Cigarettes     Start date: 1/1/1993     Passive exposure: Current    Smokeless tobacco: Never   Vaping Use    Vaping status: Never Used   Substance Use Topics    Alcohol use: Not Currently    Drug use: Yes     Frequency: 7.0 times per  "week     Types: Marijuana             7/2/2024   One time HIV Screening   Previous HIV test? No          7/2/2024   STI Screening   New sexual partner(s) since last STI/HIV test? (!) YES will test for HIV today per USPSTF guidelines      ASCVD Risk   The 10-year ASCVD risk score (Tracy ESTRADA, et al., 2019) is: 18.6%    Values used to calculate the score:      Age: 53 years      Sex: Male      Is Non- : No      Diabetic: Yes      Tobacco smoker: Yes      Systolic Blood Pressure: 123 mmHg      Is BP treated: Yes      HDL Cholesterol: 40 mg/dL      Total Cholesterol: 161 mg/dL         Reviewed and updated as needed this visit by Provider   Tobacco  Allergies  Meds  Problems  Med Hx  Surg Hx  Fam Hx               Objective    Exam  /74 (BP Location: Left arm, Patient Position: Sitting, Cuff Size: Adult Large)   Pulse 93   Temp 99.3  F (37.4  C) (Oral)   Resp 16   Ht 1.778 m (5' 10\")   Wt 141.9 kg (312 lb 12.8 oz)   SpO2 96%   BMI 44.88 kg/m     Estimated body mass index is 44.88 kg/m  as calculated from the following:    Height as of this encounter: 1.778 m (5' 10\").    Weight as of this encounter: 141.9 kg (312 lb 12.8 oz).    Physical Exam  Vitals and nursing note reviewed.   Constitutional:       General: He is not in acute distress.     Appearance: Normal appearance.   HENT:      Head: Normocephalic and atraumatic.      Right Ear: Tympanic membrane, ear canal and external ear normal.      Left Ear: Tympanic membrane, ear canal and external ear normal.      Nose: Nose normal.      Mouth/Throat:      Mouth: Mucous membranes are moist.      Pharynx: Oropharynx is clear. No oropharyngeal exudate.   Eyes:      General: No scleral icterus.     Extraocular Movements: Extraocular movements intact.      Conjunctiva/sclera: Conjunctivae normal.   Neck:      Vascular: No carotid bruit.   Cardiovascular:      Rate and Rhythm: Normal rate and regular rhythm.      Pulses: Normal " pulses.      Heart sounds: Normal heart sounds. No murmur heard.     No friction rub. No gallop.   Pulmonary:      Effort: Pulmonary effort is normal.      Breath sounds: Normal breath sounds. No wheezing, rhonchi or rales.   Musculoskeletal:         General: No swelling. Normal range of motion.      Cervical back: Normal range of motion.      Right lower leg: No edema.      Left lower leg: No edema.   Skin:     General: Skin is warm and dry.      Capillary Refill: Capillary refill takes less than 2 seconds.      Coloration: Skin is not jaundiced.      Findings: No rash.   Neurological:      General: No focal deficit present.      Mental Status: He is alert and oriented to person, place, and time.      Cranial Nerves: No cranial nerve deficit.      Gait: Gait is intact. Gait normal.      Deep Tendon Reflexes:      Reflex Scores:       Bicep reflexes are 2+ on the right side and 2+ on the left side.       Patellar reflexes are 2+ on the right side and 2+ on the left side.  Psychiatric:         Mood and Affect: Mood normal.         Thought Content: Thought content normal.         Signed Electronically by: Bernardino Aguilera, DO      Lung Cancer Screening Shared Decision Making Visit     Phillip Rodriguez, a 53 year old male, is eligible for lung cancer screening    History   Smoking Status    Every Day    Types: Cigarettes   Smokeless Tobacco    Never       I have discussed with patient the risks and benefits of screening for lung cancer with low-dose CT.     The risks include:    radiation exposure: one low dose chest CT has as much ionizing radiation as about 15 chest x-rays, or 6 months of background radiation living in [place]      false positives: most findings/nodules are NOT cancer, but some might still require additional diagnostic evaluation, including biopsy    over-diagnosis: some slow growing cancers that might never have been clinically significant will be detected and treated unnecessarily     The  benefit of early detection of lung cancer is contingent upon adherence to annual screening or more frequent follow up if indicated.     Furthermore, to benefit from screening, Phillip must be willing and able to undergo diagnostic procedures, if indicated. Although no specific guide is available for determining severity of comorbidities, it is reasonable to withhold screening in patients who have greater mortality risk from other diseases.     We did discuss that the best way to prevent lung cancer is to not smoke.    Some patients may value a numeric estimation of lung cancer risk when evaluating if lung cancer screening is right for them, here is one calculator:    ShouldIScreen  DME (Durable Medical Equipment) Orders and Documentation  CPAP supplies.     The patient was assessed and it was determined the patient is in need of the following listed DME Supplies/Equipment. Please complete supporting documentation below to demonstrate medical necessity.

## 2024-07-03 NOTE — LETTER
July 5, 2024      Phillip Rodriguez  [address] [place] [address]        To Whom It May Concern:    Phillip Rodriguez  was seen on 7/3/2024.  Please excuse absence due to this medical appointment.        Sincerely,        Bernardino Aguilera, DO

## 2024-07-04 LAB
ALT SERPL W P-5'-P-CCNC: 20 U/L (ref 0–70)
ANION GAP SERPL CALCULATED.3IONS-SCNC: 11 MMOL/L (ref 7–15)
BUN SERPL-MCNC: 15.5 MG/DL (ref 6–20)
CALCIUM SERPL-MCNC: 9.6 MG/DL (ref 8.6–10)
CHLORIDE SERPL-SCNC: 95 MMOL/L (ref 98–107)
CHOLEST SERPL-MCNC: 163 MG/DL
CREAT SERPL-MCNC: 0.9 MG/DL (ref 0.67–1.17)
CREAT UR-MCNC: 57.2 MG/DL
DEPRECATED HCO3 PLAS-SCNC: 25 MMOL/L (ref 22–29)
EGFRCR SERPLBLD CKD-EPI 2021: >90 ML/MIN/1.73M2
GLUCOSE SERPL-MCNC: 169 MG/DL (ref 70–99)
HDLC SERPL-MCNC: 46 MG/DL
HIV 1+2 AB+HIV1 P24 AG SERPL QL IA: NONREACTIVE
LDLC SERPL CALC-MCNC: 90 MG/DL
MICROALBUMIN UR-MCNC: 33 MG/L
MICROALBUMIN/CREAT UR: 57.69 MG/G CR (ref 0–17)
NONHDLC SERPL-MCNC: 117 MG/DL
POTASSIUM SERPL-SCNC: 4.4 MMOL/L (ref 3.4–5.3)
PSA SERPL DL<=0.01 NG/ML-MCNC: 1.15 NG/ML (ref 0–3.5)
SODIUM SERPL-SCNC: 131 MMOL/L (ref 135–145)
TRIGL SERPL-MCNC: 134 MG/DL

## 2024-07-04 NOTE — ASSESSMENT & PLAN NOTE
Doing very well on current regimen and A1c at 6.2 which below goal of 7.0.  Continue current treatment plan.

## 2024-07-04 NOTE — ASSESSMENT & PLAN NOTE
Still current smoker with greater than 20 pack years and over the age of 50.  Has not yet had baseline screening.  Will start screening.  Cessation encouraged.

## 2024-07-04 NOTE — ASSESSMENT & PLAN NOTE
Has been on pravastatin 20 and LDL has been holding around 80.  With recent finding on CT scan of the abdomen showing atherosclerotic disease in the aorta, will change goal to less than 70.  Will recheck lipids today and if not at goal then we will look at changing over to either rosuvastatin or atorvastatin at 10 or 40 mg respectively

## 2024-07-04 NOTE — ASSESSMENT & PLAN NOTE
Below goal of 140/90 on current regimen which does include an ACE inhibitor as well as Jardiance.  Continue current plan.

## 2024-07-04 NOTE — ASSESSMENT & PLAN NOTE
Has AutoPap at home with correct settings from the sleep study that was previously completed and in his medical records.  Does need new supplies and those were ordered today.

## 2024-07-11 NOTE — ASSESSMENT & PLAN NOTE
Recently seen for same and doing well on the current settings.  However, when supplies were requested it was found that his machine actually needs to be replaced as well.  New prescription to include machine and the settings were sent.

## 2024-07-11 NOTE — TELEPHONE ENCOUNTER
Problem List Items Addressed This Visit       DARRIN on CPAP - Primary     Recently seen for same and doing well on the current settings.  However, when supplies were requested it was found that his machine actually needs to be replaced as well.  New prescription to include machine and the settings were sent.         Relevant Orders    CPAP/Comprehensive Sleep Order (Completed)     DME (Durable Medical Equipment) Orders and Documentation  AUTOPAP with supplies.     The patient was assessed and it was determined the patient is in need of the following listed DME Supplies/Equipment. Please complete supporting documentation below to demonstrate medical necessity.      DME All Other Item(s) Documentation    List reason for need and supporting documentation for medical necessity below for each DME item.     1. DARRIN with AHI 51.9

## 2024-07-26 ENCOUNTER — HOSPITAL ENCOUNTER (OUTPATIENT)
Dept: CT IMAGING | Facility: HOSPITAL | Age: 54
Discharge: HOME | End: 2024-07-26
Attending: FAMILY MEDICINE | Admitting: FAMILY MEDICINE
Payer: COMMERCIAL

## 2024-07-26 DIAGNOSIS — Z87.891 PERSONAL HISTORY OF TOBACCO USE: ICD-10-CM

## 2024-07-26 PROCEDURE — 71271 CT THORAX LUNG CANCER SCR C-: CPT

## 2024-08-16 ENCOUNTER — OFFICE VISIT (OUTPATIENT)
Dept: OPHTHALMOLOGY | Facility: CLINIC | Age: 54
End: 2024-08-16
Attending: FAMILY MEDICINE
Payer: COMMERCIAL

## 2024-08-16 DIAGNOSIS — E11.9 TYPE 2 DIABETES MELLITUS WITHOUT COMPLICATION, WITHOUT LONG-TERM CURRENT USE OF INSULIN (H): Primary | ICD-10-CM

## 2024-08-16 DIAGNOSIS — H52.13 MYOPIA OF BOTH EYES WITH ASTIGMATISM AND PRESBYOPIA: ICD-10-CM

## 2024-08-16 DIAGNOSIS — H52.203 MYOPIA OF BOTH EYES WITH ASTIGMATISM AND PRESBYOPIA: ICD-10-CM

## 2024-08-16 DIAGNOSIS — H52.4 MYOPIA OF BOTH EYES WITH ASTIGMATISM AND PRESBYOPIA: ICD-10-CM

## 2024-08-16 DIAGNOSIS — E11.3393 MODERATE NONPROLIFERATIVE DIABETIC RETINOPATHY OF BOTH EYES WITHOUT MACULAR EDEMA ASSOCIATED WITH TYPE 2 DIABETES MELLITUS (H): ICD-10-CM

## 2024-08-16 PROCEDURE — 92004 COMPRE OPH EXAM NEW PT 1/>: CPT | Performed by: STUDENT IN AN ORGANIZED HEALTH CARE EDUCATION/TRAINING PROGRAM

## 2024-08-16 PROCEDURE — 92015 DETERMINE REFRACTIVE STATE: CPT | Performed by: STUDENT IN AN ORGANIZED HEALTH CARE EDUCATION/TRAINING PROGRAM

## 2024-08-16 ASSESSMENT — VISUAL ACUITY
OD_CC: J2
METHOD: SNELLEN - LINEAR
OS_CC: 20/20
OS_CC: J1
OD_CC: 20/25
OD_CC+: -1
OS_CC+: -1

## 2024-08-16 ASSESSMENT — REFRACTION_MANIFEST
OS_SPHERE: -4.50
OD_AXIS: 153
OS_ADD: +2.50
OD_SPHERE: -4.25
OD_ADD: +2.50
OS_AXIS: 167
OS_CYLINDER: +0.25
OD_CYLINDER: +0.50

## 2024-08-16 ASSESSMENT — CONF VISUAL FIELD
OD_INFERIOR_NASAL_RESTRICTION: 0
OD_NORMAL: 1
OS_SUPERIOR_NASAL_RESTRICTION: 0
OD_INFERIOR_TEMPORAL_RESTRICTION: 0
OS_INFERIOR_NASAL_RESTRICTION: 0
OD_SUPERIOR_TEMPORAL_RESTRICTION: 0
OS_INFERIOR_TEMPORAL_RESTRICTION: 0
OS_SUPERIOR_TEMPORAL_RESTRICTION: 0
OS_NORMAL: 1
OD_SUPERIOR_NASAL_RESTRICTION: 0

## 2024-08-16 ASSESSMENT — REFRACTION_WEARINGRX
OS_ADD: +2.25
SPECS_TYPE: PAL
OD_CYLINDER: +0.25
OS_CYLINDER: SPHERE
OS_SPHERE: -4.25
OD_ADD: +2.25
OD_AXIS: 091
OD_SPHERE: -4.00

## 2024-08-16 ASSESSMENT — TONOMETRY
OD_IOP_MMHG: 16
IOP_METHOD: APPLANATION
OS_IOP_MMHG: 17

## 2024-08-16 ASSESSMENT — CUP TO DISC RATIO
OD_RATIO: 0.1
OS_RATIO: 0.1

## 2024-08-16 ASSESSMENT — EXTERNAL EXAM - RIGHT EYE: OD_EXAM: BROW PTOSIS

## 2024-08-16 ASSESSMENT — EXTERNAL EXAM - LEFT EYE: OS_EXAM: BROW PTOSIS

## 2024-08-16 ASSESSMENT — SLIT LAMP EXAM - LIDS
COMMENTS: 1+ DERMATOCHALASIS
COMMENTS: 1+ DERMATOCHALASIS

## 2024-08-16 NOTE — LETTER
8/16/2024      Phillip Rodriguez  [address] [place] [address]      Dear Colleague,    Thank you for referring your patient, Phillip Rodriguez, to the [facility]. Please see a copy of my visit note below.     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 Has soft contact lenses, does not wear often (Dr. Lim's business card given to pt)      Type II DM - oral med controlled.   Last blood sugar - 140 yesterday PM  Lab Results   Component Value Date    A1C 6.2 07/03/2024     Used to soft contacts in the past - never had any infections.    No previous eye injuries or surgeries.      Objective:  See Ophthalmology Exam.       Assessment:  Phillip Rodriguez is a 54 year old male who presents with:   Encounter Diagnoses   Name Primary?     Type 2 diabetes mellitus without complication, without long-term current use of insulin (H)      Moderate nonproliferative diabetic retinopathy of both eyes without macular edema associated with type 2 diabetes mellitus (H)      Myopia of both eyes with astigmatism and presbyopia        Plan:  Keep blood sugars and blood pressure under good control.    Glasses prescription given    Analisa Streeter MD  [phone]     Patient Education  Diabetes weakens the blood vessels all over the body, including the eyes. Damage to the blood vessels in the eyes can cause swelling or bleeding into part of the eye (called the retina). This is called diabetic retinopathy (BETH-tin-AH-puh-thee). If not treated, this disease can cause vision loss or blindness.   Symptoms may include blurred or distorted vision, but many people have no symptoms. It's important to see your eye doctor regularly to check for problems.   Early treatment and good control can help protect  your vision. Here are the things you can do to help prevent vision loss:      1. Keep your blood sugar levels under tight control.      2. Bring high blood pressure under control.      3. No smoking.      4. Have yearly dilated eye exams.          Again, thank you for allowing me to participate in the care of your patient.        Sincerely,        Analisa Streeter MD

## 2024-08-16 NOTE — PATIENT INSTRUCTIONS
Keep blood sugars and blood pressure under good control.    Glasses prescription given    Analisa Streeter MD  [phone]     Patient Education   Diabetes weakens the blood vessels all over the body, including the eyes. Damage to the blood vessels in the eyes can cause swelling or bleeding into part of the eye (called the retina). This is called diabetic retinopathy (BETH-tin-AH-puh-thee). If not treated, this disease can cause vision loss or blindness.   Symptoms may include blurred or distorted vision, but many people have no symptoms. It's important to see your eye doctor regularly to check for problems.   Early treatment and good control can help protect your vision. Here are the things you can do to help prevent vision loss:      1. Keep your blood sugar levels under tight control.      2. Bring high blood pressure under control.      3. No smoking.      4. Have yearly dilated eye exams.

## 2024-08-16 NOTE — PROGRESS NOTES
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 Has soft contact lenses, does not wear often (Dr. Lim's business card given to pt)      Type II DM - oral med controlled.   Last blood sugar - 140 yesterday PM  Lab Results   Component Value Date    A1C 6.2 07/03/2024     Used to soft contacts in the past - never had any infections.    No previous eye injuries or surgeries.      Objective:  See Ophthalmology Exam.       Assessment:  Phillip Rodriguez is a 54 year old male who presents with:   Encounter Diagnoses   Name Primary?    Type 2 diabetes mellitus without complication, without long-term current use of insulin (H)     Moderate nonproliferative diabetic retinopathy of both eyes without macular edema associated with type 2 diabetes mellitus (H)     Myopia of both eyes with astigmatism and presbyopia        Plan:  Keep blood sugars and blood pressure under good control.    Glasses prescription given    Analisa Streeter MD  [phone]     Patient Education  Diabetes weakens the blood vessels all over the body, including the eyes. Damage to the blood vessels in the eyes can cause swelling or bleeding into part of the eye (called the retina). This is called diabetic retinopathy (BETH-tin--puh-thee). If not treated, this disease can cause vision loss or blindness.   Symptoms may include blurred or distorted vision, but many people have no symptoms. It's important to see your eye doctor regularly to check for problems.   Early treatment and good control can help protect your vision. Here are the things you can do to help prevent vision loss:      1. Keep your blood sugar levels under tight control.      2. Bring high blood pressure under control.      3. No smoking.      4. Have yearly dilated eye  exams.

## 2024-08-23 ENCOUNTER — OFFICE VISIT (OUTPATIENT)
Dept: FAMILY MEDICINE | Facility: CLINIC | Age: 54
End: 2024-08-23
Payer: COMMERCIAL

## 2024-08-23 VITALS
HEIGHT: 70 IN | SYSTOLIC BLOOD PRESSURE: 121 MMHG | BODY MASS INDEX: 45.1 KG/M2 | OXYGEN SATURATION: 97 % | TEMPERATURE: 98.7 F | HEART RATE: 81 BPM | RESPIRATION RATE: 12 BRPM | WEIGHT: 315 LBS | DIASTOLIC BLOOD PRESSURE: 77 MMHG

## 2024-08-23 DIAGNOSIS — F41.1 GENERALIZED ANXIETY DISORDER: ICD-10-CM

## 2024-08-23 DIAGNOSIS — E78.5 HYPERLIPIDEMIA LDL GOAL <70: Primary | ICD-10-CM

## 2024-08-23 DIAGNOSIS — F12.20 CANNABIS DEPENDENCE WITH CURRENT USE (H): ICD-10-CM

## 2024-08-23 PROCEDURE — 99214 OFFICE O/P EST MOD 30 MIN: CPT | Performed by: FAMILY MEDICINE

## 2024-08-23 PROCEDURE — G2211 COMPLEX E/M VISIT ADD ON: HCPCS | Performed by: FAMILY MEDICINE

## 2024-08-23 PROCEDURE — 96127 BRIEF EMOTIONAL/BEHAV ASSMT: CPT | Performed by: FAMILY MEDICINE

## 2024-08-23 RX ORDER — ROSUVASTATIN CALCIUM 10 MG/1
10 TABLET, COATED ORAL DAILY
Qty: 90 TABLET | Refills: 3 | Status: SHIPPED | OUTPATIENT
Start: 2024-08-23

## 2024-08-23 ASSESSMENT — PATIENT HEALTH QUESTIONNAIRE - PHQ9
SUM OF ALL RESPONSES TO PHQ QUESTIONS 1-9: 8
SUM OF ALL RESPONSES TO PHQ QUESTIONS 1-9: 8
10. IF YOU CHECKED OFF ANY PROBLEMS, HOW DIFFICULT HAVE THESE PROBLEMS MADE IT FOR YOU TO DO YOUR WORK, TAKE CARE OF THINGS AT HOME, OR GET ALONG WITH OTHER PEOPLE: SOMEWHAT DIFFICULT

## 2024-08-23 NOTE — ASSESSMENT & PLAN NOTE
Doing very well with weekly counseling sessions, medication, and medical cannabis.  FMLA paperwork completed today because of the weekly counseling appointments that is more than the annual sick time given through the company.  Of note with these factors in place and with counseling weekly patient has no limitations.  But the treatment program needs to continue.

## 2024-08-23 NOTE — ASSESSMENT & PLAN NOTE
LDL goal of less than 70.  Not at goal.  Will change from pravastatin 20 over to Crestor of 10, recheck cholesterol in 2 months.  If not at goal will elevate Crestor.

## 2024-08-23 NOTE — PROGRESS NOTES
Assessment & Plan   Problem List Items Addressed This Visit       Generalized anxiety disorder     Doing very well with weekly counseling sessions, medication, and medical cannabis.  FMLA paperwork completed today because of the weekly counseling appointments that is more than the annual sick time given through the company.  Of note with these factors in place and with counseling weekly patient has no limitations.  But the treatment program needs to continue.         Hyperlipidemia LDL goal <70 - Primary     LDL goal of less than 70.  Not at goal.  Will change from pravastatin 20 over to Crestor of 10, recheck cholesterol in 2 months.  If not at goal will elevate Crestor.         Relevant Medications    rosuvastatin (CRESTOR) 10 MG tablet    Other Relevant Orders    **ALT FUTURE 2mo    Lipid panel reflex to direct LDL Fasting    PRIMARY CARE FOLLOW-UP SCHEDULING    Cannabis dependence with current use (H)             Regular exercise  See Patient Instructions      Arnol Fisher is a 54 year old, presenting for the following health issues:  Follow Up (Cholesterol/PTSD/Bipolar)      8/23/2024     8:31 AM   Additional Questions   Roomed by NICHOLAS Le   Accompanied by Self         8/23/2024     8:31 AM   Patient Reported Additional Medications   Patient reports taking the following new medications N/A     Denies any chest pain, shortness of breath, dyspnea exertion, palpitations, nausea or vomiting.  Denies any changes in vision or hearing, or urinary or bowel habits.        History of Present Illness       Hyperlipidemia:  He presents for follow up of hyperlipidemia.   He is taking medication to lower cholesterol. He is having myalgia or other side effects to statin medications.    He eats 0-1 servings of fruits and vegetables daily.He consumes 1 sweetened beverage(s) daily.He exercises with enough effort to increase his heart rate 10 to 19 minutes per day.  He exercises with enough effort to increase his heart rate  "3 or less days per week.   He is taking medications regularly.         Objective    /77 (BP Location: Left arm, Patient Position: Sitting, Cuff Size: Adult Large)   Pulse 81   Temp 98.7  F (37.1  C) (Oral)   Resp 12   Ht 1.778 m (5' 10\")   Wt 144.9 kg (319 lb 6.4 oz)   SpO2 97%   BMI 45.83 kg/m    Body mass index is 45.83 kg/m .  Physical Exam  Vitals and nursing note reviewed.   Constitutional:       General: He is not in acute distress.     Appearance: Normal appearance. He is not ill-appearing.   HENT:      Head: Normocephalic and atraumatic.   Eyes:      Extraocular Movements: Extraocular movements intact.      Conjunctiva/sclera: Conjunctivae normal.   Pulmonary:      Effort: Pulmonary effort is normal.   Neurological:      Mental Status: He is alert and oriented to person, place, and time.   Psychiatric:         Attention and Perception: Attention normal.         Mood and Affect: Mood normal.         Speech: Speech normal.         Thought Content: Thought content normal.      The longitudinal plan of care for the diagnosis(es)/condition(s) as documented were addressed during this visit. Due to the added complexity in care, I will continue to support Phillip in the subsequent management and with ongoing continuity of care.        Signed Electronically by: Bernardino Aguilera,     "

## 2024-10-18 ENCOUNTER — MYC REFILL (OUTPATIENT)
Dept: FAMILY MEDICINE | Facility: CLINIC | Age: 54
End: 2024-10-18
Payer: COMMERCIAL

## 2024-10-18 DIAGNOSIS — I10 PRIMARY HYPERTENSION: ICD-10-CM

## 2024-10-18 DIAGNOSIS — E11.65 TYPE 2 DIABETES MELLITUS WITH HYPERGLYCEMIA, WITHOUT LONG-TERM CURRENT USE OF INSULIN (H): ICD-10-CM

## 2024-10-18 DIAGNOSIS — Z90.89 HISTORY OF PARATHYROIDECTOMY: ICD-10-CM

## 2024-10-18 DIAGNOSIS — R79.89 LOW VITAMIN B12 LEVEL: ICD-10-CM

## 2024-10-18 DIAGNOSIS — Z98.890 HISTORY OF PARATHYROIDECTOMY: ICD-10-CM

## 2024-10-18 DIAGNOSIS — E78.5 HYPERLIPIDEMIA LDL GOAL <70: ICD-10-CM

## 2024-10-18 DIAGNOSIS — F06.33 BIPOLAR AND RELATED DISORDER DUE TO ANOTHER MEDICAL CONDITION WITH MANIC OR HYPOMANIC-LIKE EPISODES: Primary | ICD-10-CM

## 2024-10-21 RX ORDER — LISINOPRIL 20 MG/1
20 TABLET ORAL DAILY
Qty: 90 TABLET | Refills: 3 | Status: SHIPPED | OUTPATIENT
Start: 2024-10-21

## 2024-10-21 RX ORDER — DIVALPROEX SODIUM 500 MG/1
1000 TABLET, FILM COATED, EXTENDED RELEASE ORAL AT BEDTIME
Qty: 180 TABLET | Refills: 1 | Status: SHIPPED | OUTPATIENT
Start: 2024-10-21

## 2024-10-21 RX ORDER — ROSUVASTATIN CALCIUM 10 MG/1
10 TABLET, COATED ORAL DAILY
Qty: 90 TABLET | Refills: 1 | Status: SHIPPED | OUTPATIENT
Start: 2024-10-21

## 2024-10-21 RX ORDER — ASPIRIN 81 MG/1
81 TABLET ORAL DAILY
Qty: 90 TABLET | Refills: 3 | Status: SHIPPED | OUTPATIENT
Start: 2024-10-21

## 2024-10-21 RX ORDER — ZIPRASIDONE HYDROCHLORIDE 40 MG/1
40 CAPSULE ORAL EVERY OTHER DAY
Qty: 45 CAPSULE | Refills: 11 | Status: SHIPPED | OUTPATIENT
Start: 2024-10-21 | End: 2024-10-30

## 2024-10-21 RX ORDER — METFORMIN HYDROCHLORIDE 500 MG/1
1000 TABLET, EXTENDED RELEASE ORAL 2 TIMES DAILY WITH MEALS
Qty: 360 TABLET | Refills: 3 | Status: SHIPPED | OUTPATIENT
Start: 2024-10-21 | End: 2025-10-21

## 2024-10-21 RX ORDER — GLIPIZIDE 2.5 MG/1
2.5 TABLET, EXTENDED RELEASE ORAL DAILY
Qty: 90 TABLET | Refills: 1 | Status: SHIPPED | OUTPATIENT
Start: 2024-10-21

## 2024-10-21 RX ORDER — EMPAGLIFLOZIN 25 MG/1
25 TABLET, FILM COATED ORAL DAILY
Qty: 90 TABLET | Refills: 3 | Status: SHIPPED | OUTPATIENT
Start: 2024-10-21

## 2024-10-21 RX ORDER — AMLODIPINE BESYLATE 5 MG/1
5 TABLET ORAL DAILY
Qty: 90 TABLET | Refills: 1 | Status: SHIPPED | OUTPATIENT
Start: 2024-10-21

## 2024-10-21 RX ORDER — PSYLLIUM HUSK 0.4 G
25 CAPSULE ORAL DAILY
Qty: 90 TABLET | Refills: 3 | Status: SHIPPED | OUTPATIENT
Start: 2024-10-21

## 2024-10-22 ENCOUNTER — TELEPHONE (OUTPATIENT)
Dept: FAMILY MEDICINE | Facility: CLINIC | Age: 54
End: 2024-10-22
Payer: COMMERCIAL

## 2024-10-22 DIAGNOSIS — F06.33 BIPOLAR AND RELATED DISORDER DUE TO ANOTHER MEDICAL CONDITION WITH MANIC OR HYPOMANIC-LIKE EPISODES: ICD-10-CM

## 2024-10-22 NOTE — TELEPHONE ENCOUNTER
Medication Question or Refill    Contacts       Contact Date/Time Type Contact Phone/Fax    10/22/2024 10:52 AM CDT Phone (Incoming) [id number]  [place] [address] AT [place] [address] (Pharmacy) [phone]            What medication are you calling about (include dose and sig)?:   ziprasidone (GEODON) 40 MG capsule Take 1 capsule (40 mg) by mouth every other day. Take 1 Capsule (40 mg) by mouth two times daily with meals. as needed for angel or sleep less than 6 hours. Take with at least 350 winifred of food with so it is well absorbed       Preferred Pharmacy:   [id number] - [place], [place] - [address] AT [address] [address]  Phone: [phone] Fax: [phone]      Who prescribed the medication?: Bernardino Aguilera, DO    Do you have any questions or concerns?  Yes: Pharmacy needing clarification on sig due to multiple directions within current sig. Please update and send new script over.

## 2024-10-24 ENCOUNTER — LAB (OUTPATIENT)
Dept: LAB | Facility: CLINIC | Age: 54
End: 2024-10-24
Attending: FAMILY MEDICINE
Payer: COMMERCIAL

## 2024-10-24 DIAGNOSIS — E11.9 DIABETES MELLITUS, TYPE II (H): Primary | ICD-10-CM

## 2024-10-24 DIAGNOSIS — E78.5 HYPERLIPIDEMIA LDL GOAL <70: ICD-10-CM

## 2024-10-24 LAB
EST. AVERAGE GLUCOSE BLD GHB EST-MCNC: 126 MG/DL
HBA1C MFR BLD: 6 % (ref 0–5.6)

## 2024-10-24 PROCEDURE — 83036 HEMOGLOBIN GLYCOSYLATED A1C: CPT

## 2024-10-24 PROCEDURE — 80061 LIPID PANEL: CPT

## 2024-10-24 PROCEDURE — 36415 COLL VENOUS BLD VENIPUNCTURE: CPT

## 2024-10-24 PROCEDURE — 84460 ALANINE AMINO (ALT) (SGPT): CPT

## 2024-10-25 LAB
ALT SERPL W P-5'-P-CCNC: 14 U/L (ref 0–70)
CHOLEST SERPL-MCNC: 125 MG/DL
FASTING STATUS PATIENT QL REPORTED: NO
HDLC SERPL-MCNC: 46 MG/DL
LDLC SERPL CALC-MCNC: 55 MG/DL
NONHDLC SERPL-MCNC: 79 MG/DL
TRIGL SERPL-MCNC: 121 MG/DL

## 2024-10-30 ENCOUNTER — HOSPITAL ENCOUNTER (EMERGENCY)
Facility: HOSPITAL | Age: 54
Discharge: HOME | End: 2024-10-30
Attending: FAMILY MEDICINE | Admitting: FAMILY MEDICINE
Payer: COMMERCIAL

## 2024-10-30 ENCOUNTER — APPOINTMENT (OUTPATIENT)
Dept: RADIOLOGY | Facility: HOSPITAL | Age: 54
End: 2024-10-30
Attending: FAMILY MEDICINE
Payer: COMMERCIAL

## 2024-10-30 ENCOUNTER — NURSE TRIAGE (OUTPATIENT)
Dept: FAMILY MEDICINE | Facility: CLINIC | Age: 54
End: 2024-10-30
Payer: COMMERCIAL

## 2024-10-30 VITALS
BODY MASS INDEX: 44.1 KG/M2 | HEIGHT: 71 IN | RESPIRATION RATE: 16 BRPM | DIASTOLIC BLOOD PRESSURE: 73 MMHG | OXYGEN SATURATION: 97 % | SYSTOLIC BLOOD PRESSURE: 124 MMHG | HEART RATE: 72 BPM | TEMPERATURE: 97.8 F | WEIGHT: 315 LBS

## 2024-10-30 DIAGNOSIS — E78.5 HYPERLIPIDEMIA LDL GOAL <70: ICD-10-CM

## 2024-10-30 DIAGNOSIS — R07.9 CHEST PAIN, UNSPECIFIED TYPE: ICD-10-CM

## 2024-10-30 DIAGNOSIS — E11.9 TYPE 2 DIABETES MELLITUS WITHOUT COMPLICATION, UNSPECIFIED WHETHER LONG TERM INSULIN USE (H): ICD-10-CM

## 2024-10-30 DIAGNOSIS — I10 PRIMARY HYPERTENSION: ICD-10-CM

## 2024-10-30 LAB
ALBUMIN SERPL BCG-MCNC: 4.3 G/DL (ref 3.5–5.2)
ALP SERPL-CCNC: 61 U/L (ref 40–150)
ALT SERPL W P-5'-P-CCNC: 15 U/L (ref 0–70)
ANION GAP SERPL CALCULATED.3IONS-SCNC: 13 MMOL/L (ref 7–15)
AST SERPL W P-5'-P-CCNC: 15 U/L (ref 0–45)
BASOPHILS # BLD AUTO: 0 10E3/UL (ref 0–0.2)
BASOPHILS NFR BLD AUTO: 0 %
BILIRUB DIRECT SERPL-MCNC: <0.2 MG/DL (ref 0–0.3)
BILIRUB SERPL-MCNC: 0.3 MG/DL
BUN SERPL-MCNC: 11.6 MG/DL (ref 6–20)
CALCIUM SERPL-MCNC: 9.1 MG/DL (ref 8.8–10.4)
CHLORIDE SERPL-SCNC: 96 MMOL/L (ref 98–107)
CREAT SERPL-MCNC: 0.9 MG/DL (ref 0.67–1.17)
D DIMER PPP FEU-MCNC: <0.27 UG/ML FEU (ref 0–0.5)
EGFRCR SERPLBLD CKD-EPI 2021: >90 ML/MIN/1.73M2
EOSINOPHIL # BLD AUTO: 0.1 10E3/UL (ref 0–0.7)
EOSINOPHIL NFR BLD AUTO: 1 %
ERYTHROCYTE [DISTWIDTH] IN BLOOD BY AUTOMATED COUNT: 12.5 % (ref 10–15)
GLUCOSE SERPL-MCNC: 127 MG/DL (ref 70–99)
HCO3 SERPL-SCNC: 24 MMOL/L (ref 22–29)
HCT VFR BLD AUTO: 46 % (ref 40–53)
HGB BLD-MCNC: 16.4 G/DL (ref 13.3–17.7)
HOLD SPECIMEN: NORMAL
IMM GRANULOCYTES # BLD: 0 10E3/UL
IMM GRANULOCYTES NFR BLD: 0 %
LIPASE SERPL-CCNC: 27 U/L (ref 13–60)
LYMPHOCYTES # BLD AUTO: 2 10E3/UL (ref 0.8–5.3)
LYMPHOCYTES NFR BLD AUTO: 20 %
MAGNESIUM SERPL-MCNC: 2.1 MG/DL (ref 1.7–2.3)
MCH RBC QN AUTO: 30.2 PG (ref 26.5–33)
MCHC RBC AUTO-ENTMCNC: 35.7 G/DL (ref 31.5–36.5)
MCV RBC AUTO: 85 FL (ref 78–100)
MONOCYTES # BLD AUTO: 0.7 10E3/UL (ref 0–1.3)
MONOCYTES NFR BLD AUTO: 7 %
NEUTROPHILS # BLD AUTO: 7.2 10E3/UL (ref 1.6–8.3)
NEUTROPHILS NFR BLD AUTO: 72 %
NRBC # BLD AUTO: 0 10E3/UL
NRBC BLD AUTO-RTO: 0 /100
PLATELET # BLD AUTO: 317 10E3/UL (ref 150–450)
POTASSIUM SERPL-SCNC: 4.3 MMOL/L (ref 3.4–5.3)
PROT SERPL-MCNC: 7 G/DL (ref 6.4–8.3)
RBC # BLD AUTO: 5.43 10E6/UL (ref 4.4–5.9)
SODIUM SERPL-SCNC: 133 MMOL/L (ref 135–145)
TROPONIN T SERPL HS-MCNC: 10 NG/L
TROPONIN T SERPL HS-MCNC: 10 NG/L
WBC # BLD AUTO: 10 10E3/UL (ref 4–11)

## 2024-10-30 PROCEDURE — 250N000013 HC RX MED GY IP 250 OP 250 PS 637: Performed by: FAMILY MEDICINE

## 2024-10-30 PROCEDURE — 99285 EMERGENCY DEPT VISIT HI MDM: CPT | Mod: 25

## 2024-10-30 PROCEDURE — 83735 ASSAY OF MAGNESIUM: CPT | Performed by: FAMILY MEDICINE

## 2024-10-30 PROCEDURE — 83690 ASSAY OF LIPASE: CPT | Performed by: FAMILY MEDICINE

## 2024-10-30 PROCEDURE — 82248 BILIRUBIN DIRECT: CPT | Performed by: FAMILY MEDICINE

## 2024-10-30 PROCEDURE — 36415 COLL VENOUS BLD VENIPUNCTURE: CPT | Performed by: FAMILY MEDICINE

## 2024-10-30 PROCEDURE — 85004 AUTOMATED DIFF WBC COUNT: CPT | Performed by: FAMILY MEDICINE

## 2024-10-30 PROCEDURE — 93005 ELECTROCARDIOGRAM TRACING: CPT | Performed by: STUDENT IN AN ORGANIZED HEALTH CARE EDUCATION/TRAINING PROGRAM

## 2024-10-30 PROCEDURE — 82435 ASSAY OF BLOOD CHLORIDE: CPT | Performed by: FAMILY MEDICINE

## 2024-10-30 PROCEDURE — 85379 FIBRIN DEGRADATION QUANT: CPT | Performed by: FAMILY MEDICINE

## 2024-10-30 PROCEDURE — 84484 ASSAY OF TROPONIN QUANT: CPT | Performed by: FAMILY MEDICINE

## 2024-10-30 PROCEDURE — 71046 X-RAY EXAM CHEST 2 VIEWS: CPT

## 2024-10-30 RX ORDER — ZIPRASIDONE HYDROCHLORIDE 40 MG/1
40 CAPSULE ORAL EVERY OTHER DAY
Qty: 45 CAPSULE | Refills: 11 | Status: SHIPPED | OUTPATIENT
Start: 2024-10-30

## 2024-10-30 RX ORDER — ASPIRIN 81 MG/1
324 TABLET, CHEWABLE ORAL ONCE
Status: COMPLETED | OUTPATIENT
Start: 2024-10-30 | End: 2024-10-30

## 2024-10-30 RX ORDER — MAGNESIUM HYDROXIDE/ALUMINUM HYDROXICE/SIMETHICONE 120; 1200; 1200 MG/30ML; MG/30ML; MG/30ML
30 SUSPENSION ORAL ONCE
Status: COMPLETED | OUTPATIENT
Start: 2024-10-30 | End: 2024-10-30

## 2024-10-30 RX ADMIN — ASPIRIN 81 MG CHEWABLE TABLET 324 MG: 81 TABLET CHEWABLE at 11:48

## 2024-10-30 RX ADMIN — ALUMINUM HYDROXIDE, MAGNESIUM HYDROXIDE, AND DIMETHICONE 30 ML: 200; 20; 200 SUSPENSION ORAL at 11:48

## 2024-10-30 ASSESSMENT — COLUMBIA-SUICIDE SEVERITY RATING SCALE - C-SSRS
6. HAVE YOU EVER DONE ANYTHING, STARTED TO DO ANYTHING, OR PREPARED TO DO ANYTHING TO END YOUR LIFE?: NO
1. IN THE PAST MONTH, HAVE YOU WISHED YOU WERE DEAD OR WISHED YOU COULD GO TO SLEEP AND NOT WAKE UP?: NO
2. HAVE YOU ACTUALLY HAD ANY THOUGHTS OF KILLING YOURSELF IN THE PAST MONTH?: NO

## 2024-10-30 ASSESSMENT — ACTIVITIES OF DAILY LIVING (ADL)
ADLS_ACUITY_SCORE: 0

## 2024-10-30 NOTE — Clinical Note
Phillip Rodriguez was seen and treated in our emergency department on 10/30/2024.  He may return to work on 10/31/2024.       If you have any questions or concerns, please don't hesitate to call.      Gavin Thao MD

## 2024-10-30 NOTE — TELEPHONE ENCOUNTER
"Nurse Triage SBAR    Is this a 2nd Level Triage? NO    Situation: Patient calling requesting appointment with PCP today. Reporting shaky feeling, feeling like heart was jumping around/racing this AM, some chest tightness, reports he has never been able to take his pulse, shaky, visible sweating this morning from [id number] but not when on phone with writer. Woke up at 0515 this AM, symptoms started at 0530. Denies shortness of breath.    Background: Reports he had a CT approx 4 months ago, believes it showed \"mild/moderate blockage on right aorta\", but was unsure of verbage and no follow up was recommended. Per CT report from 7/26/24: \"premature coronary artery calcification. Recommend cardiology consultation if the patient is not already followed\".     Assessment: See assessment below.     Protocol Recommended Disposition:   Go to ED Now    Recommendation:     Recommended to go to ED, patient verbalizes that he will go to ED now, can get their safely or get a ride if needed.      Elodia Jeffers, RN, BSN    Reason for Disposition   Heart beating very rapidly (e.g., > 140 / minute) and present now  (Exception: During exercise.)   Dizziness, lightheadedness, or weakness    Additional Information   Negative: Passed out (i.e., lost consciousness, collapsed and was not responding)   Negative: Difficult to awaken or acting confused (e.g., disoriented, slurred speech)   Negative: Shock suspected (e.g., cold/pale/clammy skin, too weak to stand, low BP, rapid pulse)   Negative: Visible sweat on face or sweat dripping down face     Not at time on phone with writer; reports facial sweating earlier this AM   Negative: Unable to walk, or can only walk with assistance (e.g., requires support)   Negative: Difficulty breathing   Negative: Received SHOCK from implantable cardiac defibrillator and has persisting symptoms (i.e., palpitations, lightheadedness)   Negative: Dizziness, lightheadedness, or weakness and heart beating very " "rapidly (e.g., > 140 / minute)   Negative: Dizziness, lightheadedness, or weakness and heart beating very slowly (e.g., < 50 / minute)   Negative: Sounds like a life-threatening emergency to the triager    Answer Assessment - Initial Assessment Questions  1. DESCRIPTION: \"Please describe your heart rate or heartbeat that you are having\" (e.g., fast/slow, regular/irregular, skipped or extra beats, \"palpitations\")      Felt palpitations this morning    2. ONSET: \"When did it start?\" (Minutes, hours or days)       Woke up at 0515 this AM, symptoms started at 0530, lasted until at least 0730 and has some lingering symptoms at time of call with RN at approx 1000    3. DURATION: \"How long does it last\" (e.g., seconds, minutes, hours)      By 0530 symptoms had started, sweating, tightness in chest  Some improvement, but still ongoing at 1000    4. PATTERN \"Does it come and go, or has it been constant since it started?\"  \"Does it get worse with exertion?\"   \"Are you feeling it now?\"      Has had this happen approx once every other week for the past 2 months.    5. TAP: \"Using your hand, can you tap out what you are feeling on a chair or table in front of you, so that I can hear?\" (Note: not all patients can do this)        Has not been able to check heart rate; unable to at time of call with RN    6. HEART RATE: \"Can you tell me your heart rate?\" \"How many beats in 15 seconds?\"  (Note: not all patients can do this)        Unable to report    7. RECURRENT SYMPTOM: \"Have you ever had this before?\" If Yes, ask: \"When was the last time?\" and \"What happened that time?\"       Yes, once every other weak or so; has been going on for approx 2 months    8. CAUSE: \"What do you think is causing the palpitations?\"      unknown    9. CARDIAC HISTORY: \"Do you have any history of heart disease?\" (e.g., heart attack, angina, bypass surgery, angioplasty, arrhythmia)       No  Patient reports that he had a Chest CT approx 4 months ago that " "showed- \"mild/moderate blockage on right aorta\" was unsure of actual verbage    10. OTHER SYMPTOMS: \"Do you have any other symptoms?\" (e.g., dizziness, chest pain, sweating, difficulty breathing)        unsure    11. PREGNANCY: \"Is there any chance you are pregnant?\" \"When was your last menstrual period?\"        na    Protocols used: Heart Rate and Heartbeat [id number]    "

## 2024-10-30 NOTE — ED PROVIDER NOTES
EMERGENCY DEPARTMENT ENCOUNTER      NAME: Phillip Rodriguez  AGE: 54 year old male  YOB: 1970  MRN: [id number]  EVALUATION DATE & TIME: 10/30/2024 11:25 AM    PCP: Bernardino Aguilera    ED PROVIDER: Gavin Thao M.D.    Chief Complaint   Patient presents with    Chest Pain       FINAL IMPRESSION:  1. Chest pain, unspecified type    2. Type 2 diabetes mellitus without complication, unspecified whether long term insulin use (H)    3. Primary hypertension    4. Hyperlipidemia LDL goal <70        ED COURSE & MEDICAL DECISION MAKING:    Pertinent Labs & Imaging studies independently interpreted by me. (See chart for details)  Reviewed most recent primary care visit from August 2024 which was follow-up for hyperlipidemia, anxiety, patient was doing well at that time    ED Course as of 10/30/24 1451   Wed Oct 30, 2024   1120 EKG:    Independently reviewed and interpreted by me  Performed at: 11:16 AM  Impression: Normal EKG  Rate: 89  Rhythm: Sinus  Axis: Normal  KS Interval: 160  QRS Interval: 84  QTc Interval: 455  ST Changes: No acute ischemic change  Comparison: No prior for comparison   1130 Patient seen and examined, presents today with left-sided chest pain and pressure.  Had palpitations when he got up this morning, left-sided chest pressure after that with cold sweats.  Symptoms are mostly resolved now.  On exam here, vitally stable, lungs are clear, no abdominal tenderness, initial EKG is reassuring.  Labs ordered and will give aspirin and maalox   1219 Labs ordered and independently interpreted by me with normal hepatic panel, normal magnesium, negative D-dimer, normal CBC, normal basic panel other than mild hyponatremia, normal lipase.  Troponin is indeterminate given time of onset of symptoms and will be repeated.  Chest x-ray ordered as patient has negative D-dimer.   1428 Repeat troponin is negative, chest x-ray independently interpreted by me negative for acute findings.  Patient is  stable for discharge with outpatient follow-up   1444 Updated, pain-free and agreeable to discharge.  Referral to rapid access cardiology due to risk factors         At the conclusion of the encounter I discussed the results of all of the tests and the disposition. The questions were answered. The patient or family acknowledged understanding and was agreeable with the care plan.     Medical Decision Making  Obtained supplemental history:Supplemental history obtained?: No  Reviewed external records: External records reviewed?: Inpatient Record: PCP 8/23/2024  Care impacted by chronic illness:Alcohol and/or Drug Abuse or Dependence, Diabetes, Hyperlipidemia, Hypertension, and Smoking / Nicotine Use  Did you consider but not order tests?: Work up considered but not performed and documented in chart, if applicable  Did you interpret images independently?: Independent interpretation of ECG and images noted in documentation, when applicable.  Consultation discussion with other provider:Did you involve another provider (consultant, MH, pharmacy, etc.)?: No  Discharge. No recommendations on prescription strength medication(s). I considered admission, but discharged the patient after share decision making conversation.    MIPS: Not Applicable      MEDICATIONS GIVEN IN THE EMERGENCY:  Medications   aspirin (ASA) chewable tablet 324 mg (324 mg Oral $Given 10/30/24 1148)   alum & mag hydroxide-simethicone (MAALOX) suspension 30 mL (30 mLs Oral $Given 10/30/24 1148)       NEW PRESCRIPTIONS STARTED AT TODAY'S ER VISIT  New Prescriptions    No medications on file       =================================================================    HPI    Patient information was obtained from: patient      Phillip Rodriguez is a 54 year old male with a pertinent history of TBI, HTN, HLD, DM2, JOANN, hyperparathyroidism, Cannabis dependence with current use, who presents to this ED via private vehicle with mother for evaluation of chest  pain.    Patient reports he woke up at 5:30 AM with heart racing palpitations and cold sweats. When he was getting ready to go to work, he developed some left sided anterior chest tightness. Since onset, the pain has subsided and has improved. He called RN triage line who advised him to go to the ED.     He otherwise denies associating cough and cold symptoms, nausea, vomiting, shortness of breath, and abdominal pain. He is taking his routine medications appropriately.There were no other concerns/complaints at this time.    Shx: He is a smoker. He denies alcohol use.      REVIEW OF SYSTEMS   Review of Systems   All other systems reviewed and negative    PAST MEDICAL HISTORY:  Past Medical History:   Diagnosis Date    Depressive disorder     Diabetes (H)     Hypertension     Thyroid disease        PAST SURGICAL HISTORY:  Past Surgical History:   Procedure Laterality Date    HERNIA REPAIR      SOFT TISSUE SURGERY      Ct release    WISDOM TOOTH EXTRACTION         CURRENT MEDICATIONS:    No current facility-administered medications for this encounter.     Current Outpatient Medications   Medication Sig Dispense Refill    amLODIPine (NORVASC) 5 MG tablet Take 1 tablet (5 mg) by mouth daily. 90 tablet 1    aspirin 81 MG EC tablet Take 1 tablet (81 mg) by mouth daily. 90 tablet 3    divalproex sodium extended-release (DEPAKOTE ER) 500 MG 24 hr tablet Take 2 tablets (1,000 mg) by mouth at bedtime. 180 tablet 1    EPINEPHrine (ANY BX GENERIC EQUIV) 0.3 MG/0.3ML injection 2-pack Inject 0.3 mLs (0.3 mg) into the muscle as needed for anaphylaxis 2 each 2    glipiZIDE (GLUCOTROL XL) 2.5 MG 24 hr tablet Take 1 tablet (2.5 mg) by mouth daily. 90 tablet 1    JARDIANCE 25 MG TABS tablet Take 1 tablet (25 mg) by mouth daily. 90 tablet 3    lisinopril (ZESTRIL) 20 MG tablet Take 1 tablet (20 mg) by mouth daily. 90 tablet 3    medical cannabis (Patient's own supply) Inhale 1 Dose into the lungs See Admin Instructions (The purpose of  this order is to document that the patient reports taking medical cannabis.  This is not a prescription, and is not used to certify that the patient has a qualifying medical condition.)      metFORMIN (GLUCOPHAGE XR) 500 MG 24 hr tablet Take 2 tablets (1,000 mg) by mouth 2 times daily (with meals). 360 tablet 3    Probiotic CHEW Take 1 Dose by mouth every other day      rosuvastatin (CRESTOR) 10 MG tablet Take 1 tablet (10 mg) by mouth daily. 90 tablet 1    vitamin B-12 (CYANOCOBALAMIN) 500 MCG tablet Take 1 tablet (500 mcg) by mouth daily. 90 tablet 3    VITAMIN D-1000 MAX ST 25 MCG (1000 UT) tablet Take 1 tablet (25 mcg) by mouth daily. 90 tablet 3    ziprasidone (GEODON) 40 MG capsule Take 1 capsule (40 mg) by mouth every other day. Take 1 Capsule (40 mg) by mouth two times daily with meals. as needed for angel or sleep less than 6 hours. Take with at least 350 winifred of food with so it is well absorbed 45 capsule 11       ALLERGIES:  Allergies   Allergen Reactions    Fluoxetine Headache    Semaglutide Other (See Comments)     Belching, abd bloating    Shellfish Allergy      Other Reaction(s): Throat Swelling/Closing    Amoxicillin Diarrhea       FAMILY HISTORY:  Family History   Problem Relation Age of Onset    Hypertension Mother     Depression Mother     Anxiety Disorder Mother     Arrhythmia Mother     Thyroid Disease Father     No Known Problems Sister     Hypertension Maternal Grandmother     Dementia Maternal Grandmother     Alzheimer Disease Maternal Grandmother     Heart Disease Maternal Grandfather     No Known Problems Paternal Grandmother     Heart Disease Paternal Grandfather        SOCIAL HISTORY:   Social History     Socioeconomic History    Marital status:      Spouse name: Shonda    Number of children: 2    Years of education: 14    Highest education level: Associate degree: academic program   Occupational History    Occupation:    Tobacco Use    Smoking status: Every Day      Current packs/day: 0.75     Average packs/day: 0.7 packs/day for 31.8 years (23.9 ttl pk-yrs)     Types: Cigarettes     Start date: 1/1/1993     Passive exposure: Current    Smokeless tobacco: Never   Vaping Use    Vaping status: Never Used   Substance and Sexual Activity    Alcohol use: Not Currently    Drug use: Yes     Frequency: 7.0 times per week     Types: Marijuana    Sexual activity: Not Currently   Other Topics Concern    Parent/sibling w/ CABG, MI or angioplasty before 65F 55M? No     Social Drivers of Health     Financial Resource Strain: Low Risk  (7/2/2024)    Financial Resource Strain     Within the past 12 months, have you or your family members you live with been unable to get utilities (heat, electricity) when it was really needed?: No   Food Insecurity: High Risk (7/2/2024)    Food Insecurity     Within the past 12 months, did you worry that your food would run out before you got money to buy more?: Yes     Within the past 12 months, did the food you bought just not last and you didn t have money to get more?: Yes   Transportation Needs: Low Risk  (7/2/2024)    Transportation Needs     Within the past 12 months, has lack of transportation kept you from medical appointments, getting your medicines, non-medical meetings or appointments, work, or from getting things that you need?: No   Physical Activity: Sufficiently Active (7/2/2024)    Exercise Vital Sign     Days of Exercise per Week: 7 days     Minutes of Exercise per Session: 30 min   Stress: Stress Concern Present (7/2/2024)    [employer] - Occupational Stress Questionnaire     Feeling of Stress : Rather much   Social Connections: Unknown (7/2/2024)    Social Connection and Isolation Panel [NHANES]     Frequency of Social Gatherings with Friends and Family: Once a week   Interpersonal Safety: Low Risk  (7/3/2024)    Interpersonal Safety     Do you feel physically and emotionally safe where you currently live?: Yes  "    Within the past 12 months, have you been hit, slapped, kicked or otherwise physically hurt by someone?: No     Within the past 12 months, have you been humiliated or emotionally abused in other ways by your partner or ex-partner?: No   Housing Stability: High Risk (7/2/2024)    Housing Stability     Do you have housing? : Yes     Are you worried about losing your housing?: Yes       VITALS:  /65   Pulse 75   Temp 97.8  F (36.6  C) (Temporal)   Resp 23   Ht 1.791 m (5' 10.5\")   Wt 147.4 kg (325 lb)   SpO2 96%   BMI 45.97 kg/m      PHYSICAL EXAM:  Physical Exam  Vitals and nursing note reviewed.   Constitutional:       Appearance: Normal appearance.   HENT:      Head: Normocephalic and atraumatic.      Right Ear: External ear normal.      Left Ear: External ear normal.      Nose: Nose normal.      Mouth/Throat:      Mouth: Mucous membranes are moist.   Eyes:      Extraocular Movements: Extraocular movements intact.      Conjunctiva/sclera: Conjunctivae normal.      Pupils: Pupils are equal, round, and reactive to light.   Cardiovascular:      Rate and Rhythm: Normal rate and regular rhythm.   Pulmonary:      Effort: Pulmonary effort is normal.      Breath sounds: Normal breath sounds. No wheezing or rales.   Abdominal:      General: Abdomen is flat. There is no distension.      Palpations: Abdomen is soft.      Tenderness: There is no abdominal tenderness. There is no guarding.   Musculoskeletal:         General: Normal range of motion.      Cervical back: Normal range of motion and neck supple.      Right lower leg: No edema.      Left lower leg: No edema.   Lymphadenopathy:      Cervical: No cervical adenopathy.   Skin:     General: Skin is warm and dry.   Neurological:      General: No focal deficit present.      Mental Status: He is alert and oriented to person, place, and time. Mental status is at baseline.      Comments: No gross focal neurologic deficits   Psychiatric:         Mood and Affect: " Mood normal.         Behavior: Behavior normal.         Thought Content: Thought content normal.          LAB:  All pertinent labs reviewed and interpreted.  Results for orders placed or performed during the hospital encounter of 10/30/24   Chest XR,  PA & LAT    Impression    IMPRESSION: No acute findings. The lungs are clear and there are no pleural effusions. Normal heart size. Spinal degenerative changes.   Basic metabolic panel   Result Value Ref Range    Sodium 133 (L) 135 - 145 mmol/L    Potassium 4.3 3.4 - 5.3 mmol/L    Chloride 96 (L) 98 - 107 mmol/L    Carbon Dioxide (CO2) 24 22 - 29 mmol/L    Anion Gap 13 7 - 15 mmol/L    Urea Nitrogen 11.6 6.0 - 20.0 mg/dL    Creatinine 0.90 0.67 - 1.17 mg/dL    GFR Estimate >90 >60 mL/min/1.73m2    Calcium 9.1 8.8 - 10.4 mg/dL    Glucose 127 (H) 70 - 99 mg/dL   Result Value Ref Range    Troponin T, High Sensitivity 10 <=22 ng/L   Result Value Ref Range    Magnesium 2.1 1.7 - 2.3 mg/dL   Hepatic function panel   Result Value Ref Range    Protein Total 7.0 6.4 - 8.3 g/dL    Albumin 4.3 3.5 - 5.2 g/dL    Bilirubin Total 0.3 <=1.2 mg/dL    Alkaline Phosphatase 61 40 - 150 U/L    AST 15 0 - 45 U/L    ALT 15 0 - 70 U/L    Bilirubin Direct <0.20 0.00 - 0.30 mg/dL   Result Value Ref Range    Lipase 27 13 - 60 U/L   CBC with platelets and differential   Result Value Ref Range    WBC Count 10.0 4.0 - 11.0 10e3/uL    RBC Count 5.43 4.40 - 5.90 10e6/uL    Hemoglobin 16.4 13.3 - 17.7 g/dL    Hematocrit 46.0 40.0 - 53.0 %    MCV 85 78 - 100 fL    MCH 30.2 26.5 - 33.0 pg    MCHC 35.7 31.5 - 36.5 g/dL    RDW 12.5 10.0 - 15.0 %    Platelet Count 317 150 - 450 10e3/uL    % Neutrophils 72 %    % Lymphocytes 20 %    % Monocytes 7 %    % Eosinophils 1 %    % Basophils 0 %    % Immature Granulocytes 0 %    NRBCs per 100 WBC 0 <1 /100    Absolute Neutrophils 7.2 1.6 - 8.3 10e3/uL    Absolute Lymphocytes 2.0 0.8 - 5.3 10e3/uL    Absolute Monocytes 0.7 0.0 - 1.3 10e3/uL    Absolute Eosinophils  0.1 0.0 - 0.7 10e3/uL    Absolute Basophils 0.0 0.0 - 0.2 10e3/uL    Absolute Immature Granulocytes 0.0 <=0.4 10e3/uL    Absolute NRBCs 0.0 10e3/uL   Extra Blue Top Tube   Result Value Ref Range    Hold Specimen JIC    D dimer quantitative   Result Value Ref Range    D-Dimer Quantitative <0.27 0.00 - 0.50 ug/mL FEU   Result Value Ref Range    Troponin T, High Sensitivity 10 <=22 ng/L       RADIOLOGY:  Reviewed all pertinent imaging. Please see official radiology report.  Chest XR,  PA & LAT   Final Result   IMPRESSION: No acute findings. The lungs are clear and there are no pleural effusions. Normal heart size. Spinal degenerative changes.          I, Renetta Mac, am serving as a scribe to document services personally performed by Dr. Thao based on my observation and the provider's statements to me. I, Gavin Thao MD attest that Renetta Mac is acting in a scribe capacity, has observed my performance of the services and has documented them in accordance with my direction.    Gavin Thao M.D.  Emergency Medicine  [facility] EMERGENCY DEPARTMENT  [address] [id number] [phone]  Dept: [phone]       Gavin Thao MD  10/30/24 6844

## 2024-10-30 NOTE — TELEPHONE ENCOUNTER
Sorry, it is the twice a day (not every other day) Rx changed in chart, please verify change with pharmacy.

## 2024-10-30 NOTE — ED TRIAGE NOTES
"Patient arrives to ED for evaluation of chest tightness that began around 0530 today while driving to work. He states he also had \"racing heart and cold sweats\" that persisted for over an hour. Continues to have 3/10 tightness in the lower left chest. Personal history HTN. Extensive cardiac history in immediate family.     Triage Assessment (Adult)       Row Name 10/30/24 1118          Triage Assessment    Airway WDL WDL        Respiratory WDL    Respiratory WDL WDL        Cardiac WDL    Cardiac WDL X;chest pain        Chest Pain Assessment    Chest Pain Location anterior chest, left     Character tightness     Duration since 0530 this morning     Precipitating Factors at rest  driving to work     Alleviating Factors nothing     Associated Signs/Symptoms diaphoresis     Chest Pain Intervention 12-lead ECG obtained        Cognitive/Neuro/Behavioral WDL    Cognitive/Neuro/Behavioral WDL WDL                     "

## 2024-10-30 NOTE — TELEPHONE ENCOUNTER
Called and spoke with Svetlana, pharmacist at [facility] and relayed change in prescription SIG as below, per Dr. Aguilera.  No further questions/concerns.      Elodia Nagy RN  [facility]

## 2024-10-30 NOTE — TELEPHONE ENCOUNTER
[facility] Call Center    Phone Message    May a detailed message be left on voicemail: yes     Reason for Call: Other: Pharmacy called about this matter. And would like to get clarification on the pt medication. Please call to further discuss.      Action Taken: Other: Cardiology     Travel Screening: Not Applicable     Thank you!  [facility]

## 2024-10-31 ENCOUNTER — OFFICE VISIT (OUTPATIENT)
Dept: CARDIOLOGY | Facility: CLINIC | Age: 54
End: 2024-10-31
Attending: FAMILY MEDICINE
Payer: COMMERCIAL

## 2024-10-31 VITALS
TEMPERATURE: 99.3 F | BODY MASS INDEX: 44.1 KG/M2 | WEIGHT: 315 LBS | RESPIRATION RATE: 16 BRPM | SYSTOLIC BLOOD PRESSURE: 138 MMHG | OXYGEN SATURATION: 95 % | HEIGHT: 71 IN | HEART RATE: 81 BPM | DIASTOLIC BLOOD PRESSURE: 76 MMHG

## 2024-10-31 DIAGNOSIS — E78.5 HYPERLIPIDEMIA LDL GOAL <70: ICD-10-CM

## 2024-10-31 DIAGNOSIS — E11.9 TYPE 2 DIABETES MELLITUS WITHOUT COMPLICATION, UNSPECIFIED WHETHER LONG TERM INSULIN USE (H): ICD-10-CM

## 2024-10-31 DIAGNOSIS — R07.9 CHEST PAIN, UNSPECIFIED TYPE: ICD-10-CM

## 2024-10-31 DIAGNOSIS — I25.10 CORONARY ARTERY DISEASE INVOLVING NATIVE CORONARY ARTERY OF NATIVE HEART WITHOUT ANGINA PECTORIS: Primary | ICD-10-CM

## 2024-10-31 DIAGNOSIS — R07.2 PRECORDIAL PAIN: ICD-10-CM

## 2024-10-31 DIAGNOSIS — I10 PRIMARY HYPERTENSION: ICD-10-CM

## 2024-10-31 PROCEDURE — 99204 OFFICE O/P NEW MOD 45 MIN: CPT | Performed by: INTERNAL MEDICINE

## 2024-10-31 NOTE — PROGRESS NOTES
"       [facility]   [address], [place] [address]   [email or web address]   OFFICE: [phone]          Thank you Dr. Thao for asking the [facility] team to participate in the care of your patient, Phillip Rodriguez.     Impression and Plan     1.  Coronary artery disease.  Phillip has known coronary artery disease by virtue of CT scan of chest 26 July 2024 revealing moderate coronary calcification.    As noted below, Phillip was recently seen in the Emergency Department by Dr. Gavin Thao for chest discomfort and palpitations.  Patient had awoken at 0530 with subjective \"racing palpitations\" as well as some sweats.  He also had reported some left-sided anterior chest discomfort.  Symptoms gradually improved.  He has ECG and laboratory studies including serial troponin x 2 were unremarkable.  Cannot discount possible ischemic contribution to his symptom profile is more fully described below.  Do feel further workup with ischemic evaluation is warranted.  He states that he feels he would likely have a hard time exercising sufficiently on treadmill in part due to some mild orthopedic issues.  Therefore, none treadmill imaging modality would be required.  Plan:  Regadenoson stress MRI.  Phillip states that he has had MRIs in the past and has had no issues with claustrophobia and feels he can tolerate the procedure without problem.    2.  Hypertension.  Blood pressure is slightly elevated in the office today.  Will continue to follow.    3.  Dyslipidemia.  Lipid profile 24 October 2024 revealed LDL 55 mg/dL and HDL 46 mg/dL.  Continue rosuvastatin.    Follow-up and further recommendations pending test results.    35 minutes spent reviewing prior records (including documentation, laboratory studies, cardiac testing/imaging), interview with patient along with physical exam, planning, and subsequent documentation/crafting of note).           History of Present " "Illness    Once again I would like to thank you again for asking me to participate in the care of your patient, Phillip Rodriguez.  As you know, but to reiterate for my own records, Phillip Rodriguez is a 54 year old male with coronary artery disease by virtue of CT scan of chest 26 July 2024 revealing moderate coronary calcification.    As noted below, Phillip was recently seen in the Emergency Department by Dr. Gavin Thao for chest discomfort and palpitations.  Patient had awoken at 0530 with subjective \"racing palpitations\" as well as some sweats.  He also had reported some left-sided anterior chest discomfort.  Symptoms gradually improved.  He has ECG and laboratory studies including serial troponin x 2 were unremarkable.    On further interview, as aforementioned, Phillip has been noticing some intermittent chest discomfort over the past few months.  He minimizes associated shortness of breath, however.  He describes it as a left-sided chest \"pressure\" though at times is fleeting and sharp in nature.    Further review of systems is otherwise negative/noncontributory (medical record and 13 point review of systems reviewed as well and pertinent positives noted).         Cardiac Diagnostics/Imaging      Twelve-lead ECG (personally reviewed) 30 October 2024: Sinus rhythm.  Normal ECG.    CT scan of chest 26 July 2024:  Negative for lung cancer screening purposes.  Moderate coronary calcification.    Chest radiograph 26 July 2024:  No acute findings.   The lungs are clear and there are no pleural effusions.   Normal heart size.   Spinal degenerative changes.           Physical Examination       /76 (BP Location: Left arm, Patient Position: Sitting, Cuff Size: Adult Large)   Pulse 81   Temp 99.3  F (37.4  C) (Oral)   Resp 16   Ht 1.791 m (5' 10.5\")   Wt 143.1 kg (315 lb 8 oz)   SpO2 95%   BMI 44.63 kg/m          Wt Readings from Last 3 Encounters:   10/31/24 143.1 kg (315 lb 8 oz)   10/30/24 147.4 kg (325 " lb)   08/23/24 144.9 kg (319 lb 6.4 oz)       The patient is alert and oriented times three. Sclerae are anicteric. Mucosal membranes are moist. Jugular venous pressure is normal. No significant adenopathy/thyromegally appreciated. Lungs are clear with good expansion. On cardiovascular exam, the patient has a regular S1 and S2. Abdomen is soft and non-tender. Extremities reveal no clubbing, cyanosis, or edema.       Family History/Social History/Risk Factors   Patient does not smoke.  Family history reviewed, and family history includes Alzheimer Disease in his maternal grandmother; Anxiety Disorder in his mother; Arrhythmia in his mother; Dementia in his maternal grandmother; Depression in his mother; Heart Disease in his maternal grandfather and paternal grandfather; Hypertension in his maternal grandmother and mother; No Known Problems in his paternal grandmother and sister; Thyroid Disease in his father.          Medical History  Surgical History Family History Social History   Past Medical History:   Diagnosis Date    Depressive disorder     Diabetes (H)     Hypertension     Thyroid disease      Past Surgical History:   Procedure Laterality Date    HERNIA REPAIR      SOFT TISSUE SURGERY      Ct release    WISDOM TOOTH EXTRACTION       Family History   Problem Relation Age of Onset    Hypertension Mother     Depression Mother     Anxiety Disorder Mother     Arrhythmia Mother     Thyroid Disease Father     No Known Problems Sister     Hypertension Maternal Grandmother     Dementia Maternal Grandmother     Alzheimer Disease Maternal Grandmother     Heart Disease Maternal Grandfather     No Known Problems Paternal Grandmother     Heart Disease Paternal Grandfather         Social History     Socioeconomic History    Marital status:      Spouse name: Shonda    Number of children: 2    Years of education: 14    Highest education level: Associate degree: academic program   Occupational History    Occupation:     Tobacco Use    Smoking status: Every Day     Current packs/day: 0.75     Average packs/day: 0.8 packs/day for 31.8 years (23.9 ttl pk-yrs)     Types: Cigarettes     Start date: 1/1/1993     Passive exposure: Current    Smokeless tobacco: Never   Vaping Use    Vaping status: Never Used   Substance and Sexual Activity    Alcohol use: Not Currently    Drug use: Yes     Frequency: 7.0 times per week     Types: Marijuana    Sexual activity: Not Currently   Other Topics Concern    Parent/sibling w/ CABG, MI or angioplasty before 65F 55M? No   Social History Narrative    Not on file     Social Drivers of Health     Financial Resource Strain: Low Risk  (7/2/2024)    Financial Resource Strain     Within the past 12 months, have you or your family members you live with been unable to get utilities (heat, electricity) when it was really needed?: No   Food Insecurity: High Risk (7/2/2024)    Food Insecurity     Within the past 12 months, did you worry that your food would run out before you got money to buy more?: Yes     Within the past 12 months, did the food you bought just not last and you didn t have money to get more?: Yes   Transportation Needs: Low Risk  (7/2/2024)    Transportation Needs     Within the past 12 months, has lack of transportation kept you from medical appointments, getting your medicines, non-medical meetings or appointments, work, or from getting things that you need?: No   Physical Activity: Sufficiently Active (7/2/2024)    Exercise Vital Sign     Days of Exercise per Week: 7 days     Minutes of Exercise per Session: 30 min   Stress: Stress Concern Present (7/2/2024)    [employer] - Occupational Stress Questionnaire     Feeling of Stress : Rather much   Social Connections: Unknown (7/2/2024)    Social Connection and Isolation Panel [NHANES]     Frequency of Communication with Friends and Family: Not on file     Frequency of Social Gatherings with  Friends and Family: Once a week     Attends Mandaeism Services: Not on file     Active Member of Clubs or Organizations: Not on file     Attends Club or Organization Meetings: Not on file     Marital Status: Not on file   Interpersonal Safety: Low Risk  (7/3/2024)    Interpersonal Safety     Do you feel physically and emotionally safe where you currently live?: Yes     Within the past 12 months, have you been hit, slapped, kicked or otherwise physically hurt by someone?: No     Within the past 12 months, have you been humiliated or emotionally abused in other ways by your partner or ex-partner?: No   Housing Stability: High Risk (7/2/2024)    Housing Stability     Do you have housing? : Yes     Are you worried about losing your housing?: Yes           Medications  Allergies   Current Outpatient Medications   Medication Sig Dispense Refill    amLODIPine (NORVASC) 5 MG tablet Take 1 tablet (5 mg) by mouth daily. 90 tablet 1    aspirin 81 MG EC tablet Take 1 tablet (81 mg) by mouth daily. 90 tablet 3    divalproex sodium extended-release (DEPAKOTE ER) 500 MG 24 hr tablet Take 2 tablets (1,000 mg) by mouth at bedtime. 180 tablet 1    EPINEPHrine (ANY BX GENERIC EQUIV) 0.3 MG/0.3ML injection 2-pack Inject 0.3 mLs (0.3 mg) into the muscle as needed for anaphylaxis 2 each 2    glipiZIDE (GLUCOTROL XL) 2.5 MG 24 hr tablet Take 1 tablet (2.5 mg) by mouth daily. 90 tablet 1    JARDIANCE 25 MG TABS tablet Take 1 tablet (25 mg) by mouth daily. 90 tablet 3    lisinopril (ZESTRIL) 20 MG tablet Take 1 tablet (20 mg) by mouth daily. 90 tablet 3    medical cannabis (Patient's own supply) Inhale 1 Dose into the lungs See Admin Instructions (The purpose of this order is to document that the patient reports taking medical cannabis.  This is not a prescription, and is not used to certify that the patient has a qualifying medical condition.)      metFORMIN (GLUCOPHAGE XR) 500 MG 24 hr tablet Take 2 tablets (1,000 mg) by mouth 2 times  "daily (with meals). 360 tablet 3    Probiotic CHEW Take 1 Dose by mouth every other day      rosuvastatin (CRESTOR) 10 MG tablet Take 1 tablet (10 mg) by mouth daily. 90 tablet 1    vitamin B-12 (CYANOCOBALAMIN) 500 MCG tablet Take 1 tablet (500 mcg) by mouth daily. 90 tablet 3    VITAMIN D-1000 MAX ST 25 MCG (1000 UT) tablet Take 1 tablet (25 mcg) by mouth daily. 90 tablet 3    ziprasidone (GEODON) 40 MG capsule Take 1 capsule (40 mg) by mouth every other day. Take 1 Capsule (40 mg) by mouth two times daily with meals.  Take with at least 350 winifred of food with so it is well absorbed 45 capsule 11       Allergies   Allergen Reactions    Fluoxetine Headache    Semaglutide Other (See Comments)     Belching, abd bloating    Shellfish Allergy      Other Reaction(s): Throat Swelling/Closing    Amoxicillin Diarrhea          Lab Results    Chemistry/lipid CBC Cardiac Enzymes/BNP/TSH/INR   Recent Labs   Lab Test 10/24/24  1331   CHOL 125   HDL 46   LDL 55   TRIG 121     Recent Labs   Lab Test 10/24/24  1331 07/03/24  1513   LDL 55 90     Recent Labs   Lab Test 10/30/24  1139   *   POTASSIUM 4.3   CHLORIDE 96*   CO2 24   *   BUN 11.6   CR 0.90   GFRESTIMATED >90   WINIFRED 9.1     Recent Labs   Lab Test 10/30/24  1139 07/03/24  1513 03/13/24  1157   CR 0.90 0.90 0.9     Recent Labs   Lab Test 10/24/24  1331 07/03/24  1513   A1C 6.0* 6.2*          Recent Labs   Lab Test 10/30/24  1139   WBC 10.0   HGB 16.4   HCT 46.0   MCV 85        Recent Labs   Lab Test 10/30/24  1139 07/03/24  1513 03/13/24  1108   HGB 16.4 16.3 16.3    No results for input(s): \"TROPONINI\" in the last 15945 hours.  No results for input(s): \"BNP\", \"NTBNPI\", \"NTBNP\" in the last 21153 hours.  No results for input(s): \"TSH\" in the last 31249 hours.  No results for input(s): \"INR\" in the last 15244 hours.       Medications  Allergies   Current Outpatient Medications   Medication Sig Dispense Refill    amLODIPine (NORVASC) 5 MG tablet Take 1 tablet " (5 mg) by mouth daily. 90 tablet 1    aspirin 81 MG EC tablet Take 1 tablet (81 mg) by mouth daily. 90 tablet 3    divalproex sodium extended-release (DEPAKOTE ER) 500 MG 24 hr tablet Take 2 tablets (1,000 mg) by mouth at bedtime. 180 tablet 1    EPINEPHrine (ANY BX GENERIC EQUIV) 0.3 MG/0.3ML injection 2-pack Inject 0.3 mLs (0.3 mg) into the muscle as needed for anaphylaxis 2 each 2    glipiZIDE (GLUCOTROL XL) 2.5 MG 24 hr tablet Take 1 tablet (2.5 mg) by mouth daily. 90 tablet 1    JARDIANCE 25 MG TABS tablet Take 1 tablet (25 mg) by mouth daily. 90 tablet 3    lisinopril (ZESTRIL) 20 MG tablet Take 1 tablet (20 mg) by mouth daily. 90 tablet 3    medical cannabis (Patient's own supply) Inhale 1 Dose into the lungs See Admin Instructions (The purpose of this order is to document that the patient reports taking medical cannabis.  This is not a prescription, and is not used to certify that the patient has a qualifying medical condition.)      metFORMIN (GLUCOPHAGE XR) 500 MG 24 hr tablet Take 2 tablets (1,000 mg) by mouth 2 times daily (with meals). 360 tablet 3    Probiotic CHEW Take 1 Dose by mouth every other day      rosuvastatin (CRESTOR) 10 MG tablet Take 1 tablet (10 mg) by mouth daily. 90 tablet 1    vitamin B-12 (CYANOCOBALAMIN) 500 MCG tablet Take 1 tablet (500 mcg) by mouth daily. 90 tablet 3    VITAMIN D-1000 MAX ST 25 MCG (1000 UT) tablet Take 1 tablet (25 mcg) by mouth daily. 90 tablet 3    ziprasidone (GEODON) 40 MG capsule Take 1 capsule (40 mg) by mouth every other day. Take 1 Capsule (40 mg) by mouth two times daily with meals.  Take with at least 350 winifred of food with so it is well absorbed 45 capsule 11      Allergies   Allergen Reactions    Fluoxetine Headache    Semaglutide Other (See Comments)     Belching, abd bloating    Shellfish Allergy      Other Reaction(s): Throat Swelling/Closing    Amoxicillin Diarrhea          Lab Results   Lab Results   Component Value Date     10/30/2024    CO2  "24 10/30/2024    BUN 11.6 10/30/2024     Lab Results   Component Value Date    WBC 10.0 10/30/2024    HGB 16.4 10/30/2024    HCT 46.0 10/30/2024    MCV 85 10/30/2024     10/30/2024     Lab Results   Component Value Date    CHOL 125 10/24/2024    TRIG 121 10/24/2024    TRIG 191 09/14/2023    HDL 46 10/24/2024     No results found for: \"INR\"  No results found for: \"BNP\"  No results found for: \"CKTOTAL\", \"CKMB\", \"TROPONINI\"  No results found for: \"TSH\"               "

## 2024-10-31 NOTE — LETTER
"10/31/2024    Bernardino Aguilera, DO  [address]  [facility] [address]    RE: Phillip Rodriguez       Dear Colleague,     I had the pleasure of seeing Phillip Rodriguez in the [facility].         [facility]   [address], [place] [address]   [email or web address]   OFFICE: [phone]          Thank you Dr. Thao for asking the [facility] team to participate in the care of your patient, Phillip Rodriguez.     Impression and Plan     1.  Coronary artery disease.  Phillip has known coronary artery disease by virtue of CT scan of chest 26 July 2024 revealing moderate coronary calcification.    As noted below, Phillip was recently seen in the Emergency Department by Dr. Gavin Thao for chest discomfort and palpitations.  Patient had awoken at 0530 with subjective \"racing palpitations\" as well as some sweats.  He also had reported some left-sided anterior chest discomfort.  Symptoms gradually improved.  He has ECG and laboratory studies including serial troponin x 2 were unremarkable.  Cannot discount possible ischemic contribution to his symptom profile is more fully described below.  Do feel further workup with ischemic evaluation is warranted.  He states that he feels he would likely have a hard time exercising sufficiently on treadmill in part due to some mild orthopedic issues.  Therefore, none treadmill imaging modality would be required.  Plan:  Regadenoson stress MRI.  Phillip states that he has had MRIs in the past and has had no issues with claustrophobia and feels he can tolerate the procedure without problem.    2.  Hypertension.  Blood pressure is slightly elevated in the office today.  Will continue to follow.    3.  Dyslipidemia.  Lipid profile 24 October 2024 revealed LDL 55 mg/dL and HDL 46 mg/dL.  Continue rosuvastatin.    Follow-up and further recommendations pending test results.    35 minutes spent reviewing prior " "records (including documentation, laboratory studies, cardiac testing/imaging), interview with patient along with physical exam, planning, and subsequent documentation/crafting of note).           History of Present Illness    Once again I would like to thank you again for asking me to participate in the care of your patient, Phillip Rodriguez.  As you know, but to reiterate for my own records, Phillip Rodriguez is a 54 year old male with coronary artery disease by virtue of CT scan of chest 26 July 2024 revealing moderate coronary calcification.    As noted below, Phillip was recently seen in the Emergency Department by Dr. Gavin Thao for chest discomfort and palpitations.  Patient had awoken at 0530 with subjective \"racing palpitations\" as well as some sweats.  He also had reported some left-sided anterior chest discomfort.  Symptoms gradually improved.  He has ECG and laboratory studies including serial troponin x 2 were unremarkable.    On further interview, as aforementioned, Phillip has been noticing some intermittent chest discomfort over the past few months.  He minimizes associated shortness of breath, however.  He describes it as a left-sided chest \"pressure\" though at times is fleeting and sharp in nature.    Further review of systems is otherwise negative/noncontributory (medical record and 13 point review of systems reviewed as well and pertinent positives noted).         Cardiac Diagnostics/Imaging      Twelve-lead ECG (personally reviewed) 30 October 2024: Sinus rhythm.  Normal ECG.    CT scan of chest 26 July 2024:  Negative for lung cancer screening purposes.  Moderate coronary calcification.    Chest radiograph 26 July 2024:  No acute findings.   The lungs are clear and there are no pleural effusions.   Normal heart size.   Spinal degenerative changes.           Physical Examination       /76 (BP Location: Left arm, Patient Position: Sitting, Cuff Size: Adult Large)   Pulse 81   Temp 99.3  F " "(37.4  C) (Oral)   Resp 16   Ht 1.791 m (5' 10.5\")   Wt 143.1 kg (315 lb 8 oz)   SpO2 95%   BMI 44.63 kg/m          Wt Readings from Last 3 Encounters:   10/31/24 143.1 kg (315 lb 8 oz)   10/30/24 147.4 kg (325 lb)   08/23/24 144.9 kg (319 lb 6.4 oz)       The patient is alert and oriented times three. Sclerae are anicteric. Mucosal membranes are moist. Jugular venous pressure is normal. No significant adenopathy/thyromegally appreciated. Lungs are clear with good expansion. On cardiovascular exam, the patient has a regular S1 and S2. Abdomen is soft and non-tender. Extremities reveal no clubbing, cyanosis, or edema.       Family History/Social History/Risk Factors   Patient does not smoke.  Family history reviewed, and family history includes Alzheimer Disease in his maternal grandmother; Anxiety Disorder in his mother; Arrhythmia in his mother; Dementia in his maternal grandmother; Depression in his mother; Heart Disease in his maternal grandfather and paternal grandfather; Hypertension in his maternal grandmother and mother; No Known Problems in his paternal grandmother and sister; Thyroid Disease in his father.          Medical History  Surgical History Family History Social History   Past Medical History:   Diagnosis Date     Depressive disorder      Diabetes (H)      Hypertension      Thyroid disease      Past Surgical History:   Procedure Laterality Date     HERNIA REPAIR       SOFT TISSUE SURGERY      Ct release     WISDOM TOOTH EXTRACTION       Family History   Problem Relation Age of Onset     Hypertension Mother      Depression Mother      Anxiety Disorder Mother      Arrhythmia Mother      Thyroid Disease Father      No Known Problems Sister      Hypertension Maternal Grandmother      Dementia Maternal Grandmother      Alzheimer Disease Maternal Grandmother      Heart Disease Maternal Grandfather      No Known Problems Paternal Grandmother      Heart Disease Paternal Grandfather         Social " History     Socioeconomic History     Marital status:      Spouse name: Shonda     Number of children: 2     Years of education: 14     Highest education level: Associate degree: academic program   Occupational History     Occupation:    Tobacco Use     Smoking status: Every Day     Current packs/day: 0.75     Average packs/day: 0.8 packs/day for 31.8 years (23.9 ttl pk-yrs)     Types: Cigarettes     Start date: 1/1/1993     Passive exposure: Current     Smokeless tobacco: Never   Vaping Use     Vaping status: Never Used   Substance and Sexual Activity     Alcohol use: Not Currently     Drug use: Yes     Frequency: 7.0 times per week     Types: Marijuana     Sexual activity: Not Currently   Other Topics Concern     Parent/sibling w/ CABG, MI or angioplasty before 65F 55M? No   Social History Narrative     Not on file     Social Drivers of Health     Financial Resource Strain: Low Risk  (7/2/2024)    Financial Resource Strain      Within the past 12 months, have you or your family members you live with been unable to get utilities (heat, electricity) when it was really needed?: No   Food Insecurity: High Risk (7/2/2024)    Food Insecurity      Within the past 12 months, did you worry that your food would run out before you got money to buy more?: Yes      Within the past 12 months, did the food you bought just not last and you didn t have money to get more?: Yes   Transportation Needs: Low Risk  (7/2/2024)    Transportation Needs      Within the past 12 months, has lack of transportation kept you from medical appointments, getting your medicines, non-medical meetings or appointments, work, or from getting things that you need?: No   Physical Activity: Sufficiently Active (7/2/2024)    Exercise Vital Sign      Days of Exercise per Week: 7 days      Minutes of Exercise per Session: 30 min   Stress: Stress Concern Present (7/2/2024)    [employer] [employer] - Occupational  Stress Questionnaire      Feeling of Stress : Rather much   Social Connections: Unknown (7/2/2024)    Social Connection and Isolation Panel [NHANES]      Frequency of Communication with Friends and Family: Not on file      Frequency of Social Gatherings with Friends and Family: Once a week      Attends [employer] Services: Not on file      Active Member of Clubs or Organizations: Not on file      Attends Club or Organization Meetings: Not on file      Marital Status: Not on file   Interpersonal Safety: Low Risk  (7/3/2024)    Interpersonal Safety      Do you feel physically and emotionally safe where you currently live?: Yes      Within the past 12 months, have you been hit, slapped, kicked or otherwise physically hurt by someone?: No      Within the past 12 months, have you been humiliated or emotionally abused in other ways by your partner or ex-partner?: No   Housing Stability: High Risk (7/2/2024)    Housing Stability      Do you have housing? : Yes      Are you worried about losing your housing?: Yes           Medications  Allergies   Current Outpatient Medications   Medication Sig Dispense Refill     amLODIPine (NORVASC) 5 MG tablet Take 1 tablet (5 mg) by mouth daily. 90 tablet 1     aspirin 81 MG EC tablet Take 1 tablet (81 mg) by mouth daily. 90 tablet 3     divalproex sodium extended-release (DEPAKOTE ER) 500 MG 24 hr tablet Take 2 tablets (1,000 mg) by mouth at bedtime. 180 tablet 1     EPINEPHrine (ANY BX GENERIC EQUIV) 0.3 MG/0.3ML injection 2-pack Inject 0.3 mLs (0.3 mg) into the muscle as needed for anaphylaxis 2 each 2     glipiZIDE (GLUCOTROL XL) 2.5 MG 24 hr tablet Take 1 tablet (2.5 mg) by mouth daily. 90 tablet 1     JARDIANCE 25 MG TABS tablet Take 1 tablet (25 mg) by mouth daily. 90 tablet 3     lisinopril (ZESTRIL) 20 MG tablet Take 1 tablet (20 mg) by mouth daily. 90 tablet 3     medical cannabis (Patient's own supply) Inhale 1 Dose into the lungs See Admin Instructions (The purpose of this  "order is to document that the patient reports taking medical cannabis.  This is not a prescription, and is not used to certify that the patient has a qualifying medical condition.)       metFORMIN (GLUCOPHAGE XR) 500 MG 24 hr tablet Take 2 tablets (1,000 mg) by mouth 2 times daily (with meals). 360 tablet 3     Probiotic CHEW Take 1 Dose by mouth every other day       rosuvastatin (CRESTOR) 10 MG tablet Take 1 tablet (10 mg) by mouth daily. 90 tablet 1     vitamin B-12 (CYANOCOBALAMIN) 500 MCG tablet Take 1 tablet (500 mcg) by mouth daily. 90 tablet 3     VITAMIN D-1000 MAX ST 25 MCG (1000 UT) tablet Take 1 tablet (25 mcg) by mouth daily. 90 tablet 3     ziprasidone (GEODON) 40 MG capsule Take 1 capsule (40 mg) by mouth every other day. Take 1 Capsule (40 mg) by mouth two times daily with meals.  Take with at least 350 winifred of food with so it is well absorbed 45 capsule 11       Allergies   Allergen Reactions     Fluoxetine Headache     Semaglutide Other (See Comments)     Belching, abd bloating     Shellfish Allergy      Other Reaction(s): Throat Swelling/Closing     Amoxicillin Diarrhea          Lab Results    Chemistry/lipid CBC Cardiac Enzymes/BNP/TSH/INR   Recent Labs   Lab Test 10/24/24  1331   CHOL 125   HDL 46   LDL 55   TRIG 121     Recent Labs   Lab Test 10/24/24  1331 07/03/24  1513   LDL 55 90     Recent Labs   Lab Test 10/30/24  1139   *   POTASSIUM 4.3   CHLORIDE 96*   CO2 24   *   BUN 11.6   CR 0.90   GFRESTIMATED >90   WINIFRED 9.1     Recent Labs   Lab Test 10/30/24  1139 07/03/24  1513 03/13/24  1157   CR 0.90 0.90 0.9     Recent Labs   Lab Test 10/24/24  1331 07/03/24  1513   A1C 6.0* 6.2*          Recent Labs   Lab Test 10/30/24  1139   WBC 10.0   HGB 16.4   HCT 46.0   MCV 85        Recent Labs   Lab Test 10/30/24  1139 07/03/24  1513 03/13/24  1108   HGB 16.4 16.3 16.3    No results for input(s): \"TROPONINI\" in the last 54716 hours.  No results for input(s): \"BNP\", \"NTBNPI\", " "\"NTBNP\" in the last 13459 hours.  No results for input(s): \"TSH\" in the last 92620 hours.  No results for input(s): \"INR\" in the last 26588 hours.       Medications  Allergies   Current Outpatient Medications   Medication Sig Dispense Refill     amLODIPine (NORVASC) 5 MG tablet Take 1 tablet (5 mg) by mouth daily. 90 tablet 1     aspirin 81 MG EC tablet Take 1 tablet (81 mg) by mouth daily. 90 tablet 3     divalproex sodium extended-release (DEPAKOTE ER) 500 MG 24 hr tablet Take 2 tablets (1,000 mg) by mouth at bedtime. 180 tablet 1     EPINEPHrine (ANY BX GENERIC EQUIV) 0.3 MG/0.3ML injection 2-pack Inject 0.3 mLs (0.3 mg) into the muscle as needed for anaphylaxis 2 each 2     glipiZIDE (GLUCOTROL XL) 2.5 MG 24 hr tablet Take 1 tablet (2.5 mg) by mouth daily. 90 tablet 1     JARDIANCE 25 MG TABS tablet Take 1 tablet (25 mg) by mouth daily. 90 tablet 3     lisinopril (ZESTRIL) 20 MG tablet Take 1 tablet (20 mg) by mouth daily. 90 tablet 3     medical cannabis (Patient's own supply) Inhale 1 Dose into the lungs See Admin Instructions (The purpose of this order is to document that the patient reports taking medical cannabis.  This is not a prescription, and is not used to certify that the patient has a qualifying medical condition.)       metFORMIN (GLUCOPHAGE XR) 500 MG 24 hr tablet Take 2 tablets (1,000 mg) by mouth 2 times daily (with meals). 360 tablet 3     Probiotic CHEW Take 1 Dose by mouth every other day       rosuvastatin (CRESTOR) 10 MG tablet Take 1 tablet (10 mg) by mouth daily. 90 tablet 1     vitamin B-12 (CYANOCOBALAMIN) 500 MCG tablet Take 1 tablet (500 mcg) by mouth daily. 90 tablet 3     VITAMIN D-1000 MAX ST 25 MCG (1000 UT) tablet Take 1 tablet (25 mcg) by mouth daily. 90 tablet 3     ziprasidone (GEODON) 40 MG capsule Take 1 capsule (40 mg) by mouth every other day. Take 1 Capsule (40 mg) by mouth two times daily with meals.  Take with at least 350 winifred of food with so it is well absorbed 45 " "capsule 11      Allergies   Allergen Reactions     Fluoxetine Headache     Semaglutide Other (See Comments)     Belching, abd bloating     Shellfish Allergy      Other Reaction(s): Throat Swelling/Closing     Amoxicillin Diarrhea          Lab Results   Lab Results   Component Value Date     10/30/2024    CO2 24 10/30/2024    BUN 11.6 10/30/2024     Lab Results   Component Value Date    WBC 10.0 10/30/2024    HGB 16.4 10/30/2024    HCT 46.0 10/30/2024    MCV 85 10/30/2024     10/30/2024     Lab Results   Component Value Date    CHOL 125 10/24/2024    TRIG 121 10/24/2024    TRIG 191 09/14/2023    HDL 46 10/24/2024     No results found for: \"INR\"  No results found for: \"BNP\"  No results found for: \"CKTOTAL\", \"CKMB\", \"TROPONINI\"  No results found for: \"TSH\"                   Thank you for allowing me to participate in the care of your patient.      Sincerely,     Paul Daniel MD     [facility]  cc:   Gavin Thao MD  [address] [phone]      "

## 2024-11-02 LAB
ATRIAL RATE - MUSE: 89 BPM
DIASTOLIC BLOOD PRESSURE - MUSE: NORMAL MMHG
INTERPRETATION ECG - MUSE: NORMAL
P AXIS - MUSE: 51 DEGREES
PR INTERVAL - MUSE: 160 MS
QRS DURATION - MUSE: 84 MS
QT - MUSE: 374 MS
QTC - MUSE: 455 MS
R AXIS - MUSE: 15 DEGREES
SYSTOLIC BLOOD PRESSURE - MUSE: NORMAL MMHG
T AXIS - MUSE: 41 DEGREES
VENTRICULAR RATE- MUSE: 89 BPM

## 2024-11-21 ENCOUNTER — HOSPITAL ENCOUNTER (OUTPATIENT)
Dept: MRI IMAGING | Facility: HOSPITAL | Age: 54
End: 2024-11-21
Attending: INTERNAL MEDICINE
Payer: COMMERCIAL

## 2024-11-21 VITALS — SYSTOLIC BLOOD PRESSURE: 126 MMHG | DIASTOLIC BLOOD PRESSURE: 68 MMHG | HEART RATE: 80 BPM | OXYGEN SATURATION: 93 %

## 2024-11-21 DIAGNOSIS — I25.10 CORONARY ARTERY DISEASE INVOLVING NATIVE CORONARY ARTERY OF NATIVE HEART WITHOUT ANGINA PECTORIS: ICD-10-CM

## 2024-11-21 DIAGNOSIS — E78.5 HYPERLIPIDEMIA LDL GOAL <70: ICD-10-CM

## 2024-11-21 DIAGNOSIS — I10 PRIMARY HYPERTENSION: ICD-10-CM

## 2024-11-21 DIAGNOSIS — R07.9 CHEST PAIN, UNSPECIFIED TYPE: ICD-10-CM

## 2024-11-21 DIAGNOSIS — R07.2 PRECORDIAL PAIN: ICD-10-CM

## 2024-11-21 LAB
ATRIAL RATE - MUSE: 74 BPM
ATRIAL RATE - MUSE: 79 BPM
DIASTOLIC BLOOD PRESSURE - MUSE: NORMAL MMHG
DIASTOLIC BLOOD PRESSURE - MUSE: NORMAL MMHG
INTERPRETATION ECG - MUSE: NORMAL
INTERPRETATION ECG - MUSE: NORMAL
P AXIS - MUSE: 22 DEGREES
P AXIS - MUSE: 53 DEGREES
PR INTERVAL - MUSE: 156 MS
PR INTERVAL - MUSE: 162 MS
QRS DURATION - MUSE: 88 MS
QRS DURATION - MUSE: 90 MS
QT - MUSE: 382 MS
QT - MUSE: 394 MS
QTC - MUSE: 437 MS
QTC - MUSE: 438 MS
R AXIS - MUSE: -44 DEGREES
R AXIS - MUSE: 13 DEGREES
SYSTOLIC BLOOD PRESSURE - MUSE: NORMAL MMHG
SYSTOLIC BLOOD PRESSURE - MUSE: NORMAL MMHG
T AXIS - MUSE: 31 DEGREES
T AXIS - MUSE: 59 DEGREES
VENTRICULAR RATE- MUSE: 74 BPM
VENTRICULAR RATE- MUSE: 79 BPM

## 2024-11-21 PROCEDURE — A9585 GADOBUTROL INJECTION: HCPCS | Performed by: INTERNAL MEDICINE

## 2024-11-21 PROCEDURE — 255N000002 HC RX 255 OP 636: Performed by: INTERNAL MEDICINE

## 2024-11-21 PROCEDURE — 250N000011 HC RX IP 250 OP 636: Performed by: INTERNAL MEDICINE

## 2024-11-21 PROCEDURE — 75563 CARD MRI W/STRESS IMG & DYE: CPT

## 2024-11-21 RX ORDER — CAFFEINE 200 MG
200 TABLET ORAL
Status: ACTIVE | OUTPATIENT
Start: 2024-11-21 | End: 2024-11-21

## 2024-11-21 RX ORDER — ALBUTEROL SULFATE 0.83 MG/ML
2.5 SOLUTION RESPIRATORY (INHALATION)
Status: ACTIVE | OUTPATIENT
Start: 2024-11-21 | End: 2024-11-21

## 2024-11-21 RX ORDER — GADOBUTROL 604.72 MG/ML
26 INJECTION INTRAVENOUS ONCE
Status: COMPLETED | OUTPATIENT
Start: 2024-11-21 | End: 2024-11-21

## 2024-11-21 RX ORDER — AMINOPHYLLINE 25 MG/ML
50-100 INJECTION, SOLUTION INTRAVENOUS
Status: ACTIVE | OUTPATIENT
Start: 2024-11-21

## 2024-11-21 RX ORDER — CAFFEINE CITRATE 20 MG/ML
60 SOLUTION INTRAVENOUS
Status: ACTIVE | OUTPATIENT
Start: 2024-11-21 | End: 2024-11-21

## 2024-11-21 RX ORDER — REGADENOSON 0.08 MG/ML
0.4 INJECTION, SOLUTION INTRAVENOUS ONCE
Status: COMPLETED | OUTPATIENT
Start: 2024-11-21 | End: 2024-11-21

## 2024-11-21 RX ADMIN — GADOBUTROL 26 ML: 604.72 INJECTION INTRAVENOUS at 10:21

## 2024-11-21 RX ADMIN — REGADENOSON 0.4 MG: 0.08 INJECTION, SOLUTION INTRAVENOUS at 09:24

## 2024-11-22 ENCOUNTER — TELEPHONE (OUTPATIENT)
Dept: FAMILY MEDICINE | Facility: CLINIC | Age: 54
End: 2024-11-22

## 2024-11-22 NOTE — TELEPHONE ENCOUNTER
Forms/Letter Request    Type of form/letter: Disability      Is Release of Information needed?: Yes  Was an CARRIE obtained?  Yes    Do we have the form/letter: Yes: in [employer] mailbox behind     Who is the form from? Patient    Where did/will the form come from? Patient or family brought in       When is form/letter needed by: whenever available    How would you like the form/letter returned:  emailed to email address at top of page 1 in form     Patient Notified form requests are processed in 5-7 business days:Yes    Could we send this information to you in [employer] or would you prefer to receive a phone call?:   Patient would like to be contacted via [employer]

## 2024-11-25 NOTE — TELEPHONE ENCOUNTER
Form completed and e-mailed successfully to [email or web address] as requested.    Pt notified via [employer] in another encounter as requested.

## 2025-02-15 ENCOUNTER — HEALTH MAINTENANCE LETTER (OUTPATIENT)
Age: 55
End: 2025-02-15

## 2025-04-24 DIAGNOSIS — I10 PRIMARY HYPERTENSION: ICD-10-CM

## 2025-04-24 DIAGNOSIS — E11.65 TYPE 2 DIABETES MELLITUS WITH HYPERGLYCEMIA, WITHOUT LONG-TERM CURRENT USE OF INSULIN (H): ICD-10-CM

## 2025-04-24 RX ORDER — GLIPIZIDE 2.5 MG/1
2.5 TABLET, EXTENDED RELEASE ORAL DAILY
Qty: 90 TABLET | Refills: 0 | Status: SHIPPED | OUTPATIENT
Start: 2025-04-24

## 2025-04-24 RX ORDER — AMLODIPINE BESYLATE 5 MG/1
5 TABLET ORAL DAILY
Qty: 90 TABLET | Refills: 0 | Status: SHIPPED | OUTPATIENT
Start: 2025-04-24

## 2025-04-24 NOTE — TELEPHONE ENCOUNTER
Medication Request  Medication name: amLODIPine (NORVASC) 5 MG tablet   glipiZIDE (GLUCOTROL XL) 2.5 MG 24 hr tablet   [facility]ed [facility]  When was patient last seen for this?:  08/23/2024  Patient offered appointment:  N/A  Okay to leave a detailed message: no

## 2025-05-09 PROBLEM — F12.20: Status: RESOLVED | Noted: 2024-08-23 | Resolved: 2025-05-09

## 2025-05-09 PROBLEM — Z87.820 HISTORY OF TRAUMATIC BRAIN INJURY: Status: ACTIVE | Noted: 2018-07-11

## 2025-05-09 PROBLEM — E11.3393 MODERATE NONPROLIFERATIVE DIABETIC RETINOPATHY OF BOTH EYES WITHOUT MACULAR EDEMA ASSOCIATED WITH TYPE 2 DIABETES MELLITUS (H): Status: ACTIVE | Noted: 2025-05-09

## 2025-05-09 PROBLEM — E22.2 SIADH (SYNDROME OF INAPPROPRIATE ADH PRODUCTION): Status: ACTIVE | Noted: 2018-03-01

## 2025-05-12 ENCOUNTER — RESULTS FOLLOW-UP (OUTPATIENT)
Dept: FAMILY MEDICINE | Facility: CLINIC | Age: 55
End: 2025-05-12
Payer: COMMERCIAL

## 2025-05-12 DIAGNOSIS — E22.2 SIADH (SYNDROME OF INAPPROPRIATE ADH PRODUCTION): ICD-10-CM

## 2025-05-12 DIAGNOSIS — E87.1 HYPONATREMIA: Primary | ICD-10-CM

## 2025-05-13 NOTE — TELEPHONE ENCOUNTER
5/9 OV note-   Type 2 diabetes mellitus with hyperglycemia, without long-term current use of insulin (H)  A1c today is 6.1, indicating adequate control on current regimen of glipizide and jardiance. He is on a statin and ACE. Up to date on eye exams.  We discussed medication options today; with his concurrent orthopedic complaints and morbid obesity, I think he would be an excellent candidate for a GLP medication.  He did have some bloating and overall discomfort while on semaglutide, but is open to the idea of trying a slow titration of tirzepatide.  This was sent to his pharmacy; he will update me in 4 weeks if tolerating well we can increase the dose at that time.  I will have him stop his glipizide at that time to avoid any concurrent hypoglycemia.  We can continue to reduce medications based on how he tolerates the injectable medication.  Recommend office visit in 3 months for diabetes focused visit.

## 2025-05-13 NOTE — TELEPHONE ENCOUNTER
"Per outside documentation:  \"4) Chronic hyponatremia since at least 2004  Appears to be SIADH picture, urine sodium > 40 and urine osmolality > 100  Depakote and Geodon can have side effect of hyponatremia  Eval with endocrinology in 10/2018 - endocrine causes reportedly ruled out. Has not yet had eval with kidney specialist and not interested in this.  Mom also reportedly with chronic hyponatremia  Urine osmolality > 2x serum osmolality (752 vs 269) - uptodate suggests trial of torsemide 10 mg and salt tabs to get sodium > 130 which he tried after 9/2023 appt.  Says salt pills caused nocturia and torsemide caused left sided CP so stopped both after about 2 weeks.\"  "

## 2025-05-14 ENCOUNTER — PATIENT OUTREACH (OUTPATIENT)
Dept: CARE COORDINATION | Facility: CLINIC | Age: 55
End: 2025-05-14
Payer: COMMERCIAL

## 2025-05-22 DIAGNOSIS — E78.5 HYPERLIPIDEMIA LDL GOAL <70: ICD-10-CM

## 2025-05-22 RX ORDER — ROSUVASTATIN CALCIUM 10 MG/1
10 TABLET, COATED ORAL DAILY
Qty: 90 TABLET | Refills: 2 | Status: SHIPPED | OUTPATIENT
Start: 2025-05-22

## 2025-05-22 NOTE — TELEPHONE ENCOUNTER
Medication Refill  Route to [facility]  Pharmacy Name: & Location: [id number] [place] [address] AT [place] [address] (Pharmacy)     Name of Medication: rosuvastatin (CRESTOR) 10 MG table

## 2025-06-30 ENCOUNTER — TELEPHONE (OUTPATIENT)
Dept: FAMILY MEDICINE | Facility: CLINIC | Age: 55
End: 2025-06-30
Payer: COMMERCIAL

## 2025-06-30 NOTE — TELEPHONE ENCOUNTER
Patient Quality Outreach    Patient is due for the following:   Diabetes -  A1C    Action(s) Taken:   No follow up needed at this time.    Type of outreach:    Chart review performed, no outreach needed.    Questions for provider review:    None         Flor Herring MA  Chart routed to None.

## 2025-07-23 DIAGNOSIS — E11.9 DIABETES MELLITUS, TYPE II (H): ICD-10-CM

## 2025-07-23 DIAGNOSIS — E87.1 HYPOSMOLALITY SYNDROME: ICD-10-CM

## 2025-07-23 DIAGNOSIS — Z79.899 POLYPHARMACY: Primary | ICD-10-CM

## 2025-08-10 ENCOUNTER — HEALTH MAINTENANCE LETTER (OUTPATIENT)
Age: 55
End: 2025-08-10

## 2025-08-14 SDOH — HEALTH STABILITY: PHYSICAL HEALTH: ON AVERAGE, HOW MANY DAYS PER WEEK DO YOU ENGAGE IN MODERATE TO STRENUOUS EXERCISE (LIKE A BRISK WALK)?: 2 DAYS

## 2025-08-14 SDOH — HEALTH STABILITY: PHYSICAL HEALTH: ON AVERAGE, HOW MANY MINUTES DO YOU ENGAGE IN EXERCISE AT THIS LEVEL?: 10 MIN

## 2025-08-14 ASSESSMENT — SOCIAL DETERMINANTS OF HEALTH (SDOH): HOW OFTEN DO YOU GET TOGETHER WITH FRIENDS OR RELATIVES?: NEVER

## 2025-08-18 ENCOUNTER — TELEPHONE (OUTPATIENT)
Dept: FAMILY MEDICINE | Facility: CLINIC | Age: 55
End: 2025-08-18

## 2025-08-18 ENCOUNTER — OFFICE VISIT (OUTPATIENT)
Dept: FAMILY MEDICINE | Facility: CLINIC | Age: 55
End: 2025-08-18
Attending: FAMILY MEDICINE
Payer: COMMERCIAL

## 2025-08-18 VITALS
HEIGHT: 71 IN | HEART RATE: 74 BPM | SYSTOLIC BLOOD PRESSURE: 134 MMHG | WEIGHT: 315 LBS | OXYGEN SATURATION: 99 % | RESPIRATION RATE: 16 BRPM | TEMPERATURE: 98.6 F | BODY MASS INDEX: 44.1 KG/M2 | DIASTOLIC BLOOD PRESSURE: 82 MMHG

## 2025-08-18 DIAGNOSIS — S76.012A MUSCLE STRAIN OF LEFT GLUTEAL REGION, INITIAL ENCOUNTER: ICD-10-CM

## 2025-08-18 DIAGNOSIS — E11.9 TYPE 2 DIABETES MELLITUS WITHOUT COMPLICATION, UNSPECIFIED WHETHER LONG TERM INSULIN USE (H): Primary | ICD-10-CM

## 2025-08-18 DIAGNOSIS — E87.1 HYPOSMOLALITY SYNDROME: ICD-10-CM

## 2025-08-18 DIAGNOSIS — Z79.899 POLYPHARMACY: ICD-10-CM

## 2025-08-18 DIAGNOSIS — I10 PRIMARY HYPERTENSION: ICD-10-CM

## 2025-08-18 LAB
ANION GAP SERPL CALCULATED.3IONS-SCNC: 14 MMOL/L (ref 7–15)
BUN SERPL-MCNC: 11.6 MG/DL (ref 6–20)
CALCIUM SERPL-MCNC: 9.2 MG/DL (ref 8.8–10.4)
CHLORIDE SERPL-SCNC: 95 MMOL/L (ref 98–107)
CREAT SERPL-MCNC: 0.77 MG/DL (ref 0.67–1.17)
EGFRCR SERPLBLD CKD-EPI 2021: >90 ML/MIN/1.73M2
EST. AVERAGE GLUCOSE BLD GHB EST-MCNC: 146 MG/DL
GLUCOSE SERPL-MCNC: 166 MG/DL (ref 70–99)
HBA1C MFR BLD: 6.7 % (ref 0–5.6)
HCO3 SERPL-SCNC: 23 MMOL/L (ref 22–29)
HOLD SPECIMEN: NORMAL
POTASSIUM SERPL-SCNC: 4.4 MMOL/L (ref 3.4–5.3)
SODIUM SERPL-SCNC: 132 MMOL/L (ref 135–145)

## 2025-08-18 PROCEDURE — 82043 UR ALBUMIN QUANTITATIVE: CPT

## 2025-08-18 PROCEDURE — 80048 BASIC METABOLIC PNL TOTAL CA: CPT

## 2025-08-18 PROCEDURE — 99214 OFFICE O/P EST MOD 30 MIN: CPT

## 2025-08-18 PROCEDURE — G2211 COMPLEX E/M VISIT ADD ON: HCPCS

## 2025-08-18 PROCEDURE — 80164 ASSAY DIPROPYLACETIC ACD TOT: CPT

## 2025-08-18 PROCEDURE — 3079F DIAST BP 80-89 MM HG: CPT

## 2025-08-18 PROCEDURE — 83036 HEMOGLOBIN GLYCOSYLATED A1C: CPT

## 2025-08-18 PROCEDURE — 82570 ASSAY OF URINE CREATININE: CPT

## 2025-08-18 PROCEDURE — 3044F HG A1C LEVEL LT 7.0%: CPT

## 2025-08-18 PROCEDURE — 3075F SYST BP GE 130 - 139MM HG: CPT

## 2025-08-18 PROCEDURE — 36415 COLL VENOUS BLD VENIPUNCTURE: CPT

## 2025-08-18 RX ORDER — METHOCARBAMOL 500 MG/1
500 TABLET, FILM COATED ORAL 4 TIMES DAILY PRN
Qty: 30 TABLET | Refills: 1 | Status: SHIPPED | OUTPATIENT
Start: 2025-08-18

## 2025-08-18 RX ORDER — METFORMIN HYDROCHLORIDE 500 MG/1
1000 TABLET, EXTENDED RELEASE ORAL 2 TIMES DAILY WITH MEALS
Qty: 360 TABLET | Refills: 2 | Status: SHIPPED | OUTPATIENT
Start: 2025-08-18

## 2025-08-18 RX ORDER — LISINOPRIL 20 MG/1
20 TABLET ORAL DAILY
Qty: 90 TABLET | Refills: 2 | Status: SHIPPED | OUTPATIENT
Start: 2025-08-18

## 2025-08-18 RX ORDER — DIVALPROEX SODIUM 500 MG/1
500 TABLET, FILM COATED, EXTENDED RELEASE ORAL AT BEDTIME
Qty: 180 TABLET | Refills: 1 | Status: CANCELLED | OUTPATIENT
Start: 2025-08-18

## 2025-08-18 RX ORDER — ZIPRASIDONE HYDROCHLORIDE 40 MG/1
40 CAPSULE ORAL EVERY OTHER DAY
Qty: 45 CAPSULE | Refills: 11 | Status: CANCELLED | OUTPATIENT
Start: 2025-08-18

## 2025-08-18 RX ORDER — EMPAGLIFLOZIN 25 MG/1
25 TABLET, FILM COATED ORAL DAILY
Qty: 90 TABLET | Refills: 2 | Status: SHIPPED | OUTPATIENT
Start: 2025-08-18

## 2025-08-18 ASSESSMENT — ANXIETY QUESTIONNAIRES
GAD7 TOTAL SCORE: 5
8. IF YOU CHECKED OFF ANY PROBLEMS, HOW DIFFICULT HAVE THESE MADE IT FOR YOU TO DO YOUR WORK, TAKE CARE OF THINGS AT HOME, OR GET ALONG WITH OTHER PEOPLE?: SOMEWHAT DIFFICULT
7. FEELING AFRAID AS IF SOMETHING AWFUL MIGHT HAPPEN: SEVERAL DAYS
6. BECOMING EASILY ANNOYED OR IRRITABLE: NOT AT ALL
5. BEING SO RESTLESS THAT IT IS HARD TO SIT STILL: NOT AT ALL
4. TROUBLE RELAXING: SEVERAL DAYS
2. NOT BEING ABLE TO STOP OR CONTROL WORRYING: SEVERAL DAYS
IF YOU CHECKED OFF ANY PROBLEMS ON THIS QUESTIONNAIRE, HOW DIFFICULT HAVE THESE PROBLEMS MADE IT FOR YOU TO DO YOUR WORK, TAKE CARE OF THINGS AT HOME, OR GET ALONG WITH OTHER PEOPLE: SOMEWHAT DIFFICULT
1. FEELING NERVOUS, ANXIOUS, OR ON EDGE: SEVERAL DAYS
7. FEELING AFRAID AS IF SOMETHING AWFUL MIGHT HAPPEN: SEVERAL DAYS
3. WORRYING TOO MUCH ABOUT DIFFERENT THINGS: SEVERAL DAYS

## 2025-08-18 ASSESSMENT — ENCOUNTER SYMPTOMS: BACK PAIN: 1

## 2025-08-19 ENCOUNTER — THERAPY VISIT (OUTPATIENT)
Dept: PHYSICAL THERAPY | Facility: REHABILITATION | Age: 55
End: 2025-08-19
Payer: OTHER MISCELLANEOUS

## 2025-08-19 DIAGNOSIS — S76.012A MUSCLE STRAIN OF LEFT GLUTEAL REGION, INITIAL ENCOUNTER: Primary | ICD-10-CM

## 2025-08-19 DIAGNOSIS — M53.3 SACROILIAC JOINT DYSFUNCTION OF LEFT SIDE: ICD-10-CM

## 2025-08-19 LAB
CREAT UR-MCNC: 54.1 MG/DL
MICROALBUMIN UR-MCNC: 48.6 MG/L
MICROALBUMIN/CREAT UR: 89.83 MG/G CR (ref 0–17)
VALPROATE SERPL-MCNC: 20.2 UG/ML (ref 50–100)

## 2025-08-19 PROCEDURE — 97161 PT EVAL LOW COMPLEX 20 MIN: CPT | Mod: GP | Performed by: PHYSICAL THERAPIST

## 2025-08-19 PROCEDURE — 97110 THERAPEUTIC EXERCISES: CPT | Mod: GP | Performed by: PHYSICAL THERAPIST

## 2025-08-19 ASSESSMENT — ACTIVITIES OF DAILY LIVING (ADL)
ROLLING_OVER_IN_BED: MODERATE DIFFICULTY
JUMPING: EXTREME DIFFICULTY
SPORTS_COUNT: 9
SPORTS_TOTAL_ITEM_SCORE: 0
WALKING_DOWN_STEEP_HILLS: MODERATE DIFFICULTY
SPORTS_SCORE(%): 0
HOW_WOULD_YOU_RATE_YOUR_CURRENT_LEVEL_OF_FUNCTION_DURING_YOUR_USUAL_ACTIVITIES_OF_DAILY_LIVING_FROM_0_TO_100_WITH_100_BEING_YOUR_LEVEL_OF_FUNCTION_PRIOR_TO_YOUR_HIP_PROBLEM_AND_0_BEING_THE_INABILITY_TO_PERFORM_ANY_OF_YOUR_USUAL_DAILY_ACTIVITIES?: 25
RECREATIONAL_ACTIVITIES: EXTREME DIFFICULTY
DEEP_SQUATTING: UNABLE TO DO
HEAVY_WORK: EXTREME DIFFICULTY
WALKING_UP_STEEP_HILLS: MODERATE DIFFICULTY
LOW_IMPACT_ACTIVITIES_LIKE_FAST_WALKING: SLIGHT DIFFICULTY
HOS_ADL_ITEM_SCORE_TOTAL: 27
WALKING_15_MINUTES_OR_GREATER: MODERATE DIFFICULTY
PUTTING_ON_SOCKS_AND_SHOES: MODERATE DIFFICULTY
ADL_COUNT: 17
ADL_SCORE(%): 0
PLEASE_INDICATE_YOR_PRIMARY_REASON_FOR_REFERRAL_TO_THERAPY:: HIP
GOING_UP_1_FLIGHT_OF_STAIRS: MODERATE DIFFICULTY
RUNNING_ONE_MILE: UNABLE TO DO
WALKING_15_MINUTES_OR_GREATER: MODERATE DIFFICULTY
GOING DOWN 1 FLIGHT OF STAIRS: MODERATE DIFFICULTY
STEPPING_UP_AND_DOWN_CURBS: MODERATE DIFFICULTY
WALKING_INITIALLY: MODERATE DIFFICULTY
GETTING_INTO_AND_OUT_OF_AN_AVERAGE_CAR: MODERATE DIFFICULTY
WALKING_UP_STEEP_HILLS: MODERATE DIFFICULTY
SITTING FOR 15 MINUTES: EXTREME DIFFICULTY
LIGHT_TO_MODERATE_WORK: MODERATE DIFFICULTY
STANDING FOR 15 MINUTES: MODERATE DIFFICULTY
RECREATIONAL ACTIVITIES: EXTREME DIFFICULTY
DEEP SQUATTING: UNABLE TO DO
STEPPING UP AND DOWN CURBS: MODERATE DIFFICULTY
GOING UP 1 FLIGHT OF STAIRS: MODERATE DIFFICULTY
HOW_WOULD_YOU_RATE_YOUR_CURRENT_LEVEL_OF_FUNCTION_DURING_YOUR_USUAL_ACTIVITIES_OF_DAILY_LIVING_FROM_0_TO_100_WITH_100_BEING_YOUR_LEVEL_OF_FUNCTION_PRIOR_TO_YOUR_HIP_PROBLEM_AND_0_BEING_THE_INABILITY_TO_PERFORM_ANY_OF_YOUR_USUAL_DAILY_ACTIVITIES?: 25
WALKING_FOR_APPROXIMATELY_10_MINUTES: MODERATE DIFFICULTY
HOW_WOULD_YOU_RATE_YOUR_CURRENT_LEVEL_OF_FUNCTION?: ABNORMAL
HOS_ADL_SCORE(%): 39.71
ADL_HIGHEST_POTENTIAL_SCORE: 68
TWISTING/PIVOTING_ON_INVOLVED_LEG: EXTREME DIFFICULTY
PUTTING ON SOCKS AND SHOES: MODERATE DIFFICULTY
SITTING_FOR_15_MINUTES: EXTREME DIFFICULTY
ADL_TOTAL_ITEM_SCORE: 0
GOING_DOWN_1_FLIGHT_OF_STAIRS: MODERATE DIFFICULTY
SPORTS_HIGHEST_POTENTIAL_SCORE: 36
ABILITY_TO_PERFORM_ACTIVITY_WITH_YOUR_NORMAL_TECHNIQUE: SLIGHT DIFFICULTY
GETTING INTO AND OUT OF AN AVERAGE CAR: MODERATE DIFFICULTY
GETTING_INTO_AND_OUT_OF_A_BATHTUB: UNABLE TO DO
WALKING_APPROXIMATELY_10_MINUTES: MODERATE DIFFICULTY
HOS_ADL_HIGHEST_POTENTIAL_SCORE: 68
WALKING_DOWN_STEEP_HILLS: MODERATE DIFFICULTY
GETTING_INTO_AND_OUT_OF_A_BATHTUB: UNABLE TO DO
HEAVY_WORK: EXTREME DIFFICULTY
STANDING_FOR_15_MINUTES: MODERATE DIFFICULTY
TWISTING/PIVOTING ON INVOLVED LEG: EXTREME DIFFICULTY
ROLLING OVER IN BED: MODERATE DIFFICULTY
WALKING_INITIALLY: MODERATE DIFFICULTY
HOW_WOULD_YOU_RATE_YOUR_CURRENT_LEVEL_OF_FUNCTION_DURING_YOUR_SPORTS_RELATED_ACTIVITIES_FROM_0_TO_100_WITH_100_BEING_YOUR_LEVEL_OF_FUNCTION_PRIOR_TO_YOUR_HIP_PROBLEM_AND_0_BEING_THE_INABILITY_TO_PERFORM_ANY_OF_YOUR_USUAL_DAILY_ACTIVITIES?: 25
LIGHT_TO_MODERATE_WORK: MODERATE DIFFICULTY

## 2025-08-25 ENCOUNTER — MYC MEDICAL ADVICE (OUTPATIENT)
Dept: FAMILY MEDICINE | Facility: CLINIC | Age: 55
End: 2025-08-25
Payer: COMMERCIAL

## 2025-08-26 ENCOUNTER — TELEPHONE (OUTPATIENT)
Dept: FAMILY MEDICINE | Facility: CLINIC | Age: 55
End: 2025-08-26
Payer: COMMERCIAL

## 2025-09-04 ENCOUNTER — MYC MEDICAL ADVICE (OUTPATIENT)
Dept: FAMILY MEDICINE | Facility: CLINIC | Age: 55
End: 2025-09-04
Payer: COMMERCIAL